# Patient Record
Sex: MALE | Race: WHITE | NOT HISPANIC OR LATINO
[De-identification: names, ages, dates, MRNs, and addresses within clinical notes are randomized per-mention and may not be internally consistent; named-entity substitution may affect disease eponyms.]

---

## 2017-02-02 ENCOUNTER — MEDICATION RENEWAL (OUTPATIENT)
Age: 72
End: 2017-02-02

## 2017-02-22 ENCOUNTER — APPOINTMENT (OUTPATIENT)
Dept: RHEUMATOLOGY | Facility: CLINIC | Age: 72
End: 2017-02-22

## 2017-03-01 ENCOUNTER — APPOINTMENT (OUTPATIENT)
Dept: DERMATOLOGY | Facility: CLINIC | Age: 72
End: 2017-03-01

## 2017-04-18 ENCOUNTER — MEDICATION RENEWAL (OUTPATIENT)
Age: 72
End: 2017-04-18

## 2017-06-21 ENCOUNTER — APPOINTMENT (OUTPATIENT)
Dept: RHEUMATOLOGY | Facility: CLINIC | Age: 72
End: 2017-06-21

## 2017-06-21 VITALS
BODY MASS INDEX: 22.53 KG/M2 | DIASTOLIC BLOOD PRESSURE: 80 MMHG | WEIGHT: 132 LBS | HEART RATE: 60 BPM | RESPIRATION RATE: 18 BRPM | SYSTOLIC BLOOD PRESSURE: 110 MMHG | HEIGHT: 64 IN

## 2017-06-21 DIAGNOSIS — Z00.00 ENCOUNTER FOR GENERAL ADULT MEDICAL EXAMINATION W/OUT ABNORMAL FINDINGS: ICD-10-CM

## 2017-06-21 DIAGNOSIS — R20.2 PARESTHESIA OF SKIN: ICD-10-CM

## 2017-06-22 LAB
APPEARANCE: CLEAR
BASOPHILS # BLD AUTO: 0.02 K/UL
BASOPHILS NFR BLD AUTO: 0.4 %
BILIRUBIN URINE: NEGATIVE
BLOOD URINE: NEGATIVE
COLOR: YELLOW
EOSINOPHIL # BLD AUTO: 0.1 K/UL
EOSINOPHIL NFR BLD AUTO: 2 %
GLUCOSE QUALITATIVE U: NORMAL MG/DL
HCT VFR BLD CALC: 38.2 %
HGB BLD-MCNC: 13.1 G/DL
IMM GRANULOCYTES NFR BLD AUTO: 0.2 %
KETONES URINE: NEGATIVE
LEUKOCYTE ESTERASE URINE: NEGATIVE
LYMPHOCYTES # BLD AUTO: 1.59 K/UL
LYMPHOCYTES NFR BLD AUTO: 32 %
MAN DIFF?: NORMAL
MCHC RBC-ENTMCNC: 31 PG
MCHC RBC-ENTMCNC: 34.3 GM/DL
MCV RBC AUTO: 90.5 FL
MONOCYTES # BLD AUTO: 0.39 K/UL
MONOCYTES NFR BLD AUTO: 7.8 %
NEUTROPHILS # BLD AUTO: 2.86 K/UL
NEUTROPHILS NFR BLD AUTO: 57.6 %
NITRITE URINE: NEGATIVE
PH URINE: 5.5
PLATELET # BLD AUTO: 239 K/UL
PROTEIN URINE: NEGATIVE MG/DL
RBC # BLD: 4.22 M/UL
RBC # FLD: 13.4 %
SPECIFIC GRAVITY URINE: 1.03
UROBILINOGEN URINE: NORMAL MG/DL
WBC # FLD AUTO: 4.97 K/UL
WESR: 10

## 2017-06-25 LAB
ALBUMIN SERPL ELPH-MCNC: 4.4 G/DL
ALP BLD-CCNC: 78 U/L
ALT SERPL-CCNC: 16 U/L
ANION GAP SERPL CALC-SCNC: 19 MMOL/L
AST SERPL-CCNC: 21 U/L
BILIRUB SERPL-MCNC: 0.9 MG/DL
BUN SERPL-MCNC: 27 MG/DL
CALCIUM SERPL-MCNC: 9.4 MG/DL
CHLORIDE SERPL-SCNC: 100 MMOL/L
CK SERPL-CCNC: 107 U/L
CO2 SERPL-SCNC: 20 MMOL/L
CREAT SERPL-MCNC: 1.03 MG/DL
CRP SERPL-MCNC: <0.2 MG/DL
GLUCOSE SERPL-MCNC: 88 MG/DL
PHOSPHATE SERPL-MCNC: 3.4 MG/DL
POTASSIUM SERPL-SCNC: 4.4 MMOL/L
PROT SERPL-MCNC: 6.9 G/DL
SODIUM SERPL-SCNC: 139 MMOL/L

## 2017-11-29 ENCOUNTER — APPOINTMENT (OUTPATIENT)
Dept: RHEUMATOLOGY | Facility: CLINIC | Age: 72
End: 2017-11-29
Payer: COMMERCIAL

## 2017-11-29 VITALS
OXYGEN SATURATION: 98 % | HEART RATE: 86 BPM | RESPIRATION RATE: 18 BRPM | HEIGHT: 64 IN | WEIGHT: 132 LBS | DIASTOLIC BLOOD PRESSURE: 72 MMHG | TEMPERATURE: 98.6 F | BODY MASS INDEX: 22.53 KG/M2 | SYSTOLIC BLOOD PRESSURE: 112 MMHG

## 2017-11-29 LAB
BILIRUB UR QL STRIP: NORMAL
GLUCOSE UR-MCNC: NORMAL
HCG UR QL: 0.2 EU/DL
HGB UR QL STRIP.AUTO: NORMAL
KETONES UR-MCNC: NORMAL
LEUKOCYTE ESTERASE UR QL STRIP: NORMAL
NITRITE UR QL STRIP: NORMAL
PH UR STRIP: 5.5
PROT UR STRIP-MCNC: NORMAL
SP GR UR STRIP: 1.03

## 2017-11-29 PROCEDURE — 99215 OFFICE O/P EST HI 40 MIN: CPT | Mod: 25

## 2017-11-29 PROCEDURE — 81003 URINALYSIS AUTO W/O SCOPE: CPT | Mod: QW

## 2017-11-29 PROCEDURE — 36415 COLL VENOUS BLD VENIPUNCTURE: CPT

## 2017-12-01 LAB
ALBUMIN SERPL ELPH-MCNC: 4.4 G/DL
ALP BLD-CCNC: 92 U/L
ALT SERPL-CCNC: 16 U/L
ANION GAP SERPL CALC-SCNC: 16 MMOL/L
AST SERPL-CCNC: 22 U/L
BASOPHILS # BLD AUTO: 0.03 K/UL
BASOPHILS NFR BLD AUTO: 0.6 %
BILIRUB SERPL-MCNC: 0.6 MG/DL
BUN SERPL-MCNC: 31 MG/DL
CALCIUM SERPL-MCNC: 9.7 MG/DL
CO2 SERPL-SCNC: 23 MMOL/L
CREAT SERPL-MCNC: 1.03 MG/DL
CRP SERPL-MCNC: <0.2 MG/DL
DEPRECATED KAPPA LC FREE/LAMBDA SER: 1.47 RATIO
EOSINOPHIL # BLD AUTO: 0.16 K/UL
EOSINOPHIL NFR BLD AUTO: 3 %
ERYTHROCYTE [SEDIMENTATION RATE] IN BLOOD BY WESTERGREN METHOD: 5 MM/HR
GLUCOSE SERPL-MCNC: 91 MG/DL
HCT VFR BLD CALC: 40.7 %
HGB BLD-MCNC: 13.9 G/DL
IGA SER QL IEP: 113 MG/DL
IGG SER QL IEP: 1090 MG/DL
IGM SER QL IEP: 46 MG/DL
IMM GRANULOCYTES NFR BLD AUTO: 0.2 %
KAPPA LC CSF-MCNC: 0.88 MG/DL
KAPPA LC SERPL-MCNC: 1.29 MG/DL
LYMPHOCYTES # BLD AUTO: 1.65 K/UL
LYMPHOCYTES NFR BLD AUTO: 30.6 %
M PROTEIN SPEC IFE-MCNC: NORMAL
MAN DIFF?: NORMAL
MCHC RBC-ENTMCNC: 31 PG
MCHC RBC-ENTMCNC: 34.2 GM/DL
MCV RBC AUTO: 90.8 FL
MONOCYTES # BLD AUTO: 0.41 K/UL
MONOCYTES NFR BLD AUTO: 7.6 %
NEUTROPHILS # BLD AUTO: 3.13 K/UL
NEUTROPHILS NFR BLD AUTO: 58 %
PHOSPHATE SERPL-MCNC: 3.8 MG/DL
PLATELET # BLD AUTO: 254 K/UL
POTASSIUM SERPL-SCNC: 4.7 MMOL/L
PROT SERPL-MCNC: 7.2 G/DL
RBC # BLD: 4.48 M/UL
RBC # FLD: 12.9 %
TSH SERPL-ACNC: 2.68 UIU/ML
WBC # FLD AUTO: 5.39 K/UL

## 2017-12-27 ENCOUNTER — RX RENEWAL (OUTPATIENT)
Age: 72
End: 2017-12-27

## 2018-02-22 ENCOUNTER — LABORATORY RESULT (OUTPATIENT)
Age: 73
End: 2018-02-22

## 2018-02-22 ENCOUNTER — APPOINTMENT (OUTPATIENT)
Dept: RHEUMATOLOGY | Facility: CLINIC | Age: 73
End: 2018-02-22
Payer: COMMERCIAL

## 2018-02-22 VITALS
HEART RATE: 63 BPM | RESPIRATION RATE: 17 BRPM | SYSTOLIC BLOOD PRESSURE: 120 MMHG | WEIGHT: 130 LBS | TEMPERATURE: 98.5 F | BODY MASS INDEX: 22.2 KG/M2 | HEIGHT: 64 IN | OXYGEN SATURATION: 99 % | DIASTOLIC BLOOD PRESSURE: 80 MMHG

## 2018-02-22 DIAGNOSIS — M21.931 UNSPECIFIED ACQUIRED DEFORMITY OF RIGHT FOREARM: ICD-10-CM

## 2018-02-22 PROCEDURE — 99214 OFFICE O/P EST MOD 30 MIN: CPT

## 2018-02-22 RX ORDER — IPRATROPIUM BROMIDE 42 UG/1
0.06 SPRAY NASAL
Qty: 15 | Refills: 0 | Status: DISCONTINUED | COMMUNITY
Start: 2017-12-13

## 2018-02-22 RX ORDER — MUPIROCIN 20 MG/G
2 OINTMENT TOPICAL
Qty: 22 | Refills: 0 | Status: DISCONTINUED | COMMUNITY
Start: 2018-02-08

## 2018-02-22 RX ORDER — AZITHROMYCIN 250 MG/1
250 TABLET, FILM COATED ORAL
Qty: 6 | Refills: 0 | Status: DISCONTINUED | COMMUNITY
Start: 2017-12-13

## 2018-02-22 RX ORDER — DOXYCYCLINE HYCLATE 100 MG/1
100 CAPSULE ORAL
Qty: 20 | Refills: 0 | Status: DISCONTINUED | COMMUNITY
Start: 2018-02-08

## 2018-02-22 RX ORDER — FLUTICASONE PROPIONATE 50 UG/1
50 SPRAY, METERED NASAL
Qty: 16 | Refills: 0 | Status: DISCONTINUED | COMMUNITY
Start: 2017-12-13

## 2018-02-22 RX ORDER — OFLOXACIN 3 MG/ML
0.3 SOLUTION/ DROPS OPHTHALMIC
Qty: 10 | Refills: 0 | Status: DISCONTINUED | COMMUNITY
Start: 2017-11-21

## 2018-02-22 RX ORDER — BENZONATATE 100 MG/1
100 CAPSULE ORAL
Qty: 30 | Refills: 0 | Status: DISCONTINUED | COMMUNITY
Start: 2017-12-13

## 2018-02-22 RX ORDER — DIPHENHYDRAMINE HYDROCHLORIDE AND LIDOCAINE HYDROCHLORIDE AND ALUMINUM HYDROXIDE AND MAGNESIUM HYDRO
KIT
Qty: 237 | Refills: 0 | Status: DISCONTINUED | COMMUNITY
Start: 2017-12-13

## 2018-02-22 RX ORDER — LEVOCETIRIZINE DIHYDROCHLORIDE 5 MG/1
5 TABLET ORAL
Qty: 21 | Refills: 0 | Status: DISCONTINUED | COMMUNITY
Start: 2017-11-21

## 2018-02-24 LAB
ALBUMIN SERPL ELPH-MCNC: 4.3 G/DL
ALP BLD-CCNC: 85 U/L
ALT SERPL-CCNC: 11 U/L
ANA PAT FLD IF-IMP: NORMAL
ANA SER IF-ACNC: ABNORMAL
ANION GAP SERPL CALC-SCNC: 13 MMOL/L
APPEARANCE: ABNORMAL
AST SERPL-CCNC: 20 U/L
BASOPHILS # BLD AUTO: 0.01 K/UL
BASOPHILS NFR BLD AUTO: 0.2 %
BILIRUB SERPL-MCNC: 0.5 MG/DL
BILIRUBIN URINE: NEGATIVE
BLOOD URINE: NEGATIVE
BUN SERPL-MCNC: 30 MG/DL
CALCIUM SERPL-MCNC: 9.9 MG/DL
CCP AB SER IA-ACNC: < 8 UNITS
CHLORIDE SERPL-SCNC: 103 MMOL/L
CK SERPL-CCNC: 67 U/L
CO2 SERPL-SCNC: 23 MMOL/L
COLOR: YELLOW
CREAT SERPL-MCNC: 1.08 MG/DL
CRP SERPL-MCNC: <0.2 MG/DL
ENA SS-A AB SER IA-ACNC: <0.2 AL
ENA SS-B AB SER IA-ACNC: <0.2 AL
EOSINOPHIL # BLD AUTO: 0.1 K/UL
EOSINOPHIL NFR BLD AUTO: 1.8 %
ERYTHROCYTE [SEDIMENTATION RATE] IN BLOOD BY WESTERGREN METHOD: 5 MM/HR
GLUCOSE QUALITATIVE U: NEGATIVE MG/DL
GLUCOSE SERPL-MCNC: 92 MG/DL
HCT VFR BLD CALC: 42.4 %
HGB BLD-MCNC: 13.9 G/DL
IMM GRANULOCYTES NFR BLD AUTO: 0.2 %
KETONES URINE: NEGATIVE
LEUKOCYTE ESTERASE URINE: NEGATIVE
LYMPHOCYTES # BLD AUTO: 1.82 K/UL
LYMPHOCYTES NFR BLD AUTO: 32.4 %
MAN DIFF?: NORMAL
MCHC RBC-ENTMCNC: 30 PG
MCHC RBC-ENTMCNC: 32.8 GM/DL
MCV RBC AUTO: 91.6 FL
MONOCYTES # BLD AUTO: 0.48 K/UL
MONOCYTES NFR BLD AUTO: 8.6 %
NEUTROPHILS # BLD AUTO: 3.19 K/UL
NEUTROPHILS NFR BLD AUTO: 56.8 %
NITRITE URINE: NEGATIVE
PH URINE: 7
PHOSPHATE SERPL-MCNC: 3.4 MG/DL
PLATELET # BLD AUTO: 244 K/UL
POTASSIUM SERPL-SCNC: 4.3 MMOL/L
PROT SERPL-MCNC: 7.4 G/DL
PROTEIN URINE: NEGATIVE MG/DL
RBC # BLD: 4.63 M/UL
RBC # FLD: 13.1 %
RF+CCP IGG SER-IMP: NEGATIVE
RHEUMATOID FACT SER QL: <7 IU/ML
SODIUM SERPL-SCNC: 139 MMOL/L
SPECIFIC GRAVITY URINE: 1.02
UROBILINOGEN URINE: NEGATIVE MG/DL
WBC # FLD AUTO: 5.61 K/UL

## 2018-03-02 ENCOUNTER — APPOINTMENT (OUTPATIENT)
Dept: DERMATOLOGY | Facility: CLINIC | Age: 73
End: 2018-03-02
Payer: COMMERCIAL

## 2018-03-02 PROCEDURE — 99214 OFFICE O/P EST MOD 30 MIN: CPT

## 2018-04-23 ENCOUNTER — APPOINTMENT (OUTPATIENT)
Dept: RHEUMATOLOGY | Facility: CLINIC | Age: 73
End: 2018-04-23
Payer: COMMERCIAL

## 2018-04-23 VITALS
DIASTOLIC BLOOD PRESSURE: 81 MMHG | SYSTOLIC BLOOD PRESSURE: 116 MMHG | TEMPERATURE: 98.1 F | BODY MASS INDEX: 23.39 KG/M2 | OXYGEN SATURATION: 98 % | RESPIRATION RATE: 17 BRPM | HEART RATE: 69 BPM | HEIGHT: 63 IN | WEIGHT: 132 LBS

## 2018-04-23 PROCEDURE — 99214 OFFICE O/P EST MOD 30 MIN: CPT

## 2018-04-29 ENCOUNTER — MEDICATION RENEWAL (OUTPATIENT)
Age: 73
End: 2018-04-29

## 2018-08-10 ENCOUNTER — APPOINTMENT (OUTPATIENT)
Dept: RHEUMATOLOGY | Facility: CLINIC | Age: 73
End: 2018-08-10

## 2018-08-14 ENCOUNTER — MEDICATION RENEWAL (OUTPATIENT)
Age: 73
End: 2018-08-14

## 2018-08-15 ENCOUNTER — APPOINTMENT (OUTPATIENT)
Dept: RHEUMATOLOGY | Facility: CLINIC | Age: 73
End: 2018-08-15
Payer: COMMERCIAL

## 2018-08-15 VITALS
BODY MASS INDEX: 22.2 KG/M2 | OXYGEN SATURATION: 98 % | HEART RATE: 65 BPM | RESPIRATION RATE: 16 BRPM | WEIGHT: 130 LBS | DIASTOLIC BLOOD PRESSURE: 64 MMHG | TEMPERATURE: 98.1 F | HEIGHT: 64 IN | SYSTOLIC BLOOD PRESSURE: 110 MMHG

## 2018-08-15 DIAGNOSIS — L40.9 PSORIASIS, UNSPECIFIED: ICD-10-CM

## 2018-08-15 PROCEDURE — 81003 URINALYSIS AUTO W/O SCOPE: CPT | Mod: QW

## 2018-08-15 PROCEDURE — 36415 COLL VENOUS BLD VENIPUNCTURE: CPT

## 2018-08-15 PROCEDURE — 99214 OFFICE O/P EST MOD 30 MIN: CPT | Mod: 25

## 2018-08-15 PROCEDURE — 85651 RBC SED RATE NONAUTOMATED: CPT

## 2018-08-16 LAB
BILIRUB UR QL STRIP: NORMAL
CLARITY UR: CLEAR
COLLECTION METHOD: NORMAL
GLUCOSE UR-MCNC: NORMAL
HCG UR QL: 0.2 EU/DL
HGB UR QL STRIP.AUTO: NORMAL
KETONES UR-MCNC: NORMAL
LEUKOCYTE ESTERASE UR QL STRIP: NORMAL
NITRITE UR QL STRIP: NORMAL
PH UR STRIP: 6.5
PROT UR STRIP-MCNC: NORMAL
SP GR UR STRIP: 1.01
WESR: 7

## 2018-08-17 LAB
ALBUMIN SERPL ELPH-MCNC: 4.5 G/DL
ALP BLD-CCNC: 88 U/L
ALT SERPL-CCNC: 16 U/L
ANION GAP SERPL CALC-SCNC: 16 MMOL/L
AST SERPL-CCNC: 20 U/L
BASOPHILS # BLD AUTO: 0.02 K/UL
BASOPHILS NFR BLD AUTO: 0.4 %
BILIRUB SERPL-MCNC: 0.6 MG/DL
BUN SERPL-MCNC: 20 MG/DL
CALCIUM SERPL-MCNC: 9.6 MG/DL
CHLORIDE SERPL-SCNC: 101 MMOL/L
CO2 SERPL-SCNC: 23 MMOL/L
CREAT SERPL-MCNC: 0.9 MG/DL
CRP SERPL-MCNC: <0.1 MG/DL
EOSINOPHIL # BLD AUTO: 0.08 K/UL
EOSINOPHIL NFR BLD AUTO: 1.7 %
GLUCOSE SERPL-MCNC: 95 MG/DL
HCT VFR BLD CALC: 43.8 %
HGB BLD-MCNC: 14.1 G/DL
IMM GRANULOCYTES NFR BLD AUTO: 0.2 %
LYMPHOCYTES # BLD AUTO: 1.42 K/UL
LYMPHOCYTES NFR BLD AUTO: 30.7 %
MAN DIFF?: NORMAL
MCHC RBC-ENTMCNC: 30.5 PG
MCHC RBC-ENTMCNC: 32.2 GM/DL
MCV RBC AUTO: 94.6 FL
MONOCYTES # BLD AUTO: 0.28 K/UL
MONOCYTES NFR BLD AUTO: 6.1 %
NEUTROPHILS # BLD AUTO: 2.81 K/UL
NEUTROPHILS NFR BLD AUTO: 60.9 %
PHOSPHATE SERPL-MCNC: 3.1 MG/DL
PLATELET # BLD AUTO: 257 K/UL
POTASSIUM SERPL-SCNC: 4.6 MMOL/L
PROT SERPL-MCNC: 6.9 G/DL
RBC # BLD: 4.63 M/UL
RBC # FLD: 14.2 %
SODIUM SERPL-SCNC: 140 MMOL/L
WBC # FLD AUTO: 4.62 K/UL

## 2018-10-05 ENCOUNTER — MEDICATION RENEWAL (OUTPATIENT)
Age: 73
End: 2018-10-05

## 2018-11-12 ENCOUNTER — LABORATORY RESULT (OUTPATIENT)
Age: 73
End: 2018-11-12

## 2018-11-13 ENCOUNTER — APPOINTMENT (OUTPATIENT)
Dept: RHEUMATOLOGY | Facility: CLINIC | Age: 73
End: 2018-11-13
Payer: COMMERCIAL

## 2018-11-13 VITALS
HEART RATE: 69 BPM | BODY MASS INDEX: 22.53 KG/M2 | TEMPERATURE: 97.5 F | HEIGHT: 64 IN | OXYGEN SATURATION: 98 % | DIASTOLIC BLOOD PRESSURE: 84 MMHG | RESPIRATION RATE: 17 BRPM | WEIGHT: 132 LBS | SYSTOLIC BLOOD PRESSURE: 138 MMHG

## 2018-11-13 PROCEDURE — 36415 COLL VENOUS BLD VENIPUNCTURE: CPT

## 2018-11-13 PROCEDURE — 99215 OFFICE O/P EST HI 40 MIN: CPT | Mod: 25

## 2018-11-13 PROCEDURE — 81003 URINALYSIS AUTO W/O SCOPE: CPT | Mod: NC,QW

## 2018-11-13 RX ORDER — TOLMETIN SODIUM 600 MG/1
600 TABLET, FILM COATED ORAL
Qty: 180 | Refills: 0 | Status: DISCONTINUED | COMMUNITY
Start: 2018-04-23 | End: 2018-11-13

## 2018-11-15 LAB
ALBUMIN SERPL ELPH-MCNC: 4.5 G/DL
ALP BLD-CCNC: 90 U/L
ALT SERPL-CCNC: 15 U/L
ANION GAP SERPL CALC-SCNC: 14 MMOL/L
AST SERPL-CCNC: 21 U/L
B BURGDOR IGG+IGM SER QL IB: NORMAL
BASOPHILS # BLD AUTO: 0.03 K/UL
BASOPHILS NFR BLD AUTO: 0.6 %
BILIRUB SERPL-MCNC: 0.6 MG/DL
BILIRUB UR QL STRIP: NORMAL
BUN SERPL-MCNC: 28 MG/DL
CALCIUM SERPL-MCNC: 9.5 MG/DL
CHLORIDE SERPL-SCNC: 103 MMOL/L
CLARITY UR: CLEAR
CO2 SERPL-SCNC: 23 MMOL/L
COLLECTION METHOD: NORMAL
CREAT SERPL-MCNC: 0.85 MG/DL
CRP SERPL-MCNC: 0.11 MG/DL
DEPRECATED KAPPA LC FREE/LAMBDA SER: 1.53 RATIO
ENA SS-A AB SER IA-ACNC: <0.2 AL
ENA SS-B AB SER IA-ACNC: <0.2 AL
EOSINOPHIL # BLD AUTO: 0.17 K/UL
EOSINOPHIL NFR BLD AUTO: 3.2 %
ERYTHROCYTE [SEDIMENTATION RATE] IN BLOOD BY WESTERGREN METHOD: 11 MM/HR
GLUCOSE SERPL-MCNC: 86 MG/DL
GLUCOSE UR-MCNC: NORMAL
HCG UR QL: 0.2 EU/DL
HCT VFR BLD CALC: 43.2 %
HGB BLD-MCNC: 14.7 G/DL
HGB UR QL STRIP.AUTO: NORMAL
IGA SER QL IEP: 127 MG/DL
IGG SER QL IEP: 1055 MG/DL
IGM SER QL IEP: 51 MG/DL
IMM GRANULOCYTES NFR BLD AUTO: 0.2 %
KAPPA LC CSF-MCNC: 0.88 MG/DL
KAPPA LC SERPL-MCNC: 1.35 MG/DL
KETONES UR-MCNC: NORMAL
LEUKOCYTE ESTERASE UR QL STRIP: NORMAL
LYMPHOCYTES # BLD AUTO: 1.63 K/UL
LYMPHOCYTES NFR BLD AUTO: 30.4 %
M PROTEIN SPEC IFE-MCNC: NORMAL
MAN DIFF?: NORMAL
MCHC RBC-ENTMCNC: 31.5 PG
MCHC RBC-ENTMCNC: 34 GM/DL
MCV RBC AUTO: 92.5 FL
MONOCYTES # BLD AUTO: 0.45 K/UL
MONOCYTES NFR BLD AUTO: 8.4 %
NEUTROPHILS # BLD AUTO: 3.08 K/UL
NEUTROPHILS NFR BLD AUTO: 57.2 %
NITRITE UR QL STRIP: NORMAL
PH UR STRIP: 5.5
PHOSPHATE SERPL-MCNC: 4 MG/DL
PLATELET # BLD AUTO: 282 K/UL
POTASSIUM SERPL-SCNC: 4.9 MMOL/L
PROT SERPL-MCNC: 7.2 G/DL
PROT UR STRIP-MCNC: NORMAL
RBC # BLD: 4.67 M/UL
RBC # FLD: 13.5 %
RHEUMATOID FACT SER QL: <10 IU/ML
SODIUM SERPL-SCNC: 140 MMOL/L
SP GR UR STRIP: >=1.03
TSH SERPL-ACNC: 3.37 UIU/ML
WBC # FLD AUTO: 5.37 K/UL

## 2018-11-17 LAB
ANA PAT FLD IF-IMP: NORMAL
ANA SER IF-ACNC: ABNORMAL
CCP AB SER IA-ACNC: <8 UNITS
DSDNA AB SER-ACNC: 12 IU/ML
RF+CCP IGG SER-IMP: NEGATIVE

## 2019-02-21 ENCOUNTER — APPOINTMENT (OUTPATIENT)
Dept: RHEUMATOLOGY | Facility: CLINIC | Age: 74
End: 2019-02-21
Payer: COMMERCIAL

## 2019-02-21 VITALS
TEMPERATURE: 98.3 F | HEART RATE: 55 BPM | OXYGEN SATURATION: 98 % | WEIGHT: 132 LBS | HEIGHT: 64 IN | BODY MASS INDEX: 22.53 KG/M2 | RESPIRATION RATE: 17 BRPM | DIASTOLIC BLOOD PRESSURE: 60 MMHG | SYSTOLIC BLOOD PRESSURE: 114 MMHG

## 2019-02-21 DIAGNOSIS — M25.532 PAIN IN RIGHT WRIST: ICD-10-CM

## 2019-02-21 DIAGNOSIS — M25.531 PAIN IN RIGHT WRIST: ICD-10-CM

## 2019-02-21 PROCEDURE — 81003 URINALYSIS AUTO W/O SCOPE: CPT | Mod: NC,QW

## 2019-02-21 PROCEDURE — 99214 OFFICE O/P EST MOD 30 MIN: CPT | Mod: 25

## 2019-02-21 PROCEDURE — 36415 COLL VENOUS BLD VENIPUNCTURE: CPT

## 2019-02-23 LAB
ALBUMIN SERPL ELPH-MCNC: 4.6 G/DL
ALP BLD-CCNC: 87 U/L
ALT SERPL-CCNC: 15 U/L
ANION GAP SERPL CALC-SCNC: 19 MMOL/L
AST SERPL-CCNC: 20 U/L
BASOPHILS # BLD AUTO: 0.03 K/UL
BASOPHILS NFR BLD AUTO: 0.6 %
BILIRUB SERPL-MCNC: 0.7 MG/DL
BILIRUB UR QL STRIP: NORMAL
BUN SERPL-MCNC: 21 MG/DL
CALCIUM SERPL-MCNC: 9.4 MG/DL
CCP AB SER IA-ACNC: <8 UNITS
CHLORIDE SERPL-SCNC: 105 MMOL/L
CLARITY UR: CLEAR
CO2 SERPL-SCNC: 18 MMOL/L
COLLECTION METHOD: NORMAL
CREAT SERPL-MCNC: 0.96 MG/DL
CRP SERPL-MCNC: <0.1 MG/DL
EOSINOPHIL # BLD AUTO: 0.15 K/UL
EOSINOPHIL NFR BLD AUTO: 3.1 %
ERYTHROCYTE [SEDIMENTATION RATE] IN BLOOD BY WESTERGREN METHOD: 4 MM/HR
GLUCOSE SERPL-MCNC: 110 MG/DL
GLUCOSE UR-MCNC: NORMAL
HCG UR QL: 0.2 EU/DL
HCT VFR BLD CALC: 42.4 %
HGB BLD-MCNC: 13.8 G/DL
HGB UR QL STRIP.AUTO: NORMAL
IMM GRANULOCYTES NFR BLD AUTO: 0.2 %
KETONES UR-MCNC: NORMAL
LEUKOCYTE ESTERASE UR QL STRIP: NORMAL
LYMPHOCYTES # BLD AUTO: 1.89 K/UL
LYMPHOCYTES NFR BLD AUTO: 39.4 %
MAN DIFF?: NORMAL
MCHC RBC-ENTMCNC: 30.4 PG
MCHC RBC-ENTMCNC: 32.5 GM/DL
MCV RBC AUTO: 93.4 FL
MONOCYTES # BLD AUTO: 0.31 K/UL
MONOCYTES NFR BLD AUTO: 6.5 %
NEUTROPHILS # BLD AUTO: 2.41 K/UL
NEUTROPHILS NFR BLD AUTO: 50.2 %
NITRITE UR QL STRIP: NORMAL
PH UR STRIP: 6
PHOSPHATE SERPL-MCNC: 2.8 MG/DL
PLATELET # BLD AUTO: 236 K/UL
POTASSIUM SERPL-SCNC: 3.9 MMOL/L
PROT SERPL-MCNC: 6.8 G/DL
PROT UR STRIP-MCNC: NORMAL
RBC # BLD: 4.54 M/UL
RBC # FLD: 13 %
RF+CCP IGG SER-IMP: NEGATIVE
SODIUM SERPL-SCNC: 142 MMOL/L
SP GR UR STRIP: 1.02
WBC # FLD AUTO: 4.8 K/UL

## 2019-02-27 LAB — HLA-B27 RELATED AG QL: NORMAL

## 2019-03-01 ENCOUNTER — APPOINTMENT (OUTPATIENT)
Dept: DERMATOLOGY | Facility: CLINIC | Age: 74
End: 2019-03-01
Payer: COMMERCIAL

## 2019-03-01 PROCEDURE — 99213 OFFICE O/P EST LOW 20 MIN: CPT

## 2019-03-02 LAB — RHEUMATOID FACT SER QL: 10 IU/ML

## 2019-04-25 ENCOUNTER — APPOINTMENT (OUTPATIENT)
Dept: RHEUMATOLOGY | Facility: CLINIC | Age: 74
End: 2019-04-25
Payer: COMMERCIAL

## 2019-04-25 ENCOUNTER — LABORATORY RESULT (OUTPATIENT)
Age: 74
End: 2019-04-25

## 2019-04-25 VITALS
TEMPERATURE: 98.1 F | OXYGEN SATURATION: 98 % | DIASTOLIC BLOOD PRESSURE: 82 MMHG | HEART RATE: 60 BPM | SYSTOLIC BLOOD PRESSURE: 118 MMHG | RESPIRATION RATE: 18 BRPM

## 2019-04-25 PROCEDURE — 99215 OFFICE O/P EST HI 40 MIN: CPT | Mod: 25

## 2019-04-25 PROCEDURE — 36415 COLL VENOUS BLD VENIPUNCTURE: CPT

## 2019-04-25 PROCEDURE — 81003 URINALYSIS AUTO W/O SCOPE: CPT | Mod: NC,QW

## 2019-04-26 LAB
ALBUMIN SERPL ELPH-MCNC: 4.6 G/DL
ALP BLD-CCNC: 75 U/L
ALT SERPL-CCNC: 15 U/L
ANION GAP SERPL CALC-SCNC: 12 MMOL/L
AST SERPL-CCNC: 21 U/L
B BURGDOR IGG+IGM SER QL IB: NORMAL
BASOPHILS # BLD AUTO: 0.03 K/UL
BASOPHILS NFR BLD AUTO: 0.6 %
BILIRUB SERPL-MCNC: 0.6 MG/DL
BILIRUB UR QL STRIP: NORMAL
BUN SERPL-MCNC: 26 MG/DL
CALCIUM SERPL-MCNC: 9.4 MG/DL
CCP AB SER IA-ACNC: <8 UNITS
CHLORIDE SERPL-SCNC: 104 MMOL/L
CLARITY UR: CLEAR
CO2 SERPL-SCNC: 23 MMOL/L
COLLECTION METHOD: NORMAL
CREAT SERPL-MCNC: 0.95 MG/DL
CRP SERPL-MCNC: <0.1 MG/DL
EOSINOPHIL # BLD AUTO: 0.11 K/UL
EOSINOPHIL NFR BLD AUTO: 2.2 %
ERYTHROCYTE [SEDIMENTATION RATE] IN BLOOD BY WESTERGREN METHOD: 6 MM/HR
GLUCOSE SERPL-MCNC: 94 MG/DL
GLUCOSE UR-MCNC: NORMAL
HCG UR QL: 0.2 EU/DL
HCT VFR BLD CALC: 42.6 %
HGB BLD-MCNC: 14.2 G/DL
HGB UR QL STRIP.AUTO: NORMAL
IMM GRANULOCYTES NFR BLD AUTO: 0.2 %
KETONES UR-MCNC: NORMAL
LDH SERPL-CCNC: 173 U/L
LEUKOCYTE ESTERASE UR QL STRIP: NORMAL
LYMPHOCYTES # BLD AUTO: 1.39 K/UL
LYMPHOCYTES NFR BLD AUTO: 27.4 %
MAN DIFF?: NORMAL
MCHC RBC-ENTMCNC: 30.4 PG
MCHC RBC-ENTMCNC: 33.3 GM/DL
MCV RBC AUTO: 91.2 FL
MONOCYTES # BLD AUTO: 0.37 K/UL
MONOCYTES NFR BLD AUTO: 7.3 %
NEUTROPHILS # BLD AUTO: 3.17 K/UL
NEUTROPHILS NFR BLD AUTO: 62.3 %
NITRITE UR QL STRIP: NORMAL
PH UR STRIP: 5.5
PHOSPHATE SERPL-MCNC: 3.4 MG/DL
PLATELET # BLD AUTO: 236 K/UL
POTASSIUM SERPL-SCNC: 4.2 MMOL/L
PROT SERPL-MCNC: 7 G/DL
PROT UR STRIP-MCNC: NORMAL
RBC # BLD: 4.67 M/UL
RBC # FLD: 12.6 %
RF+CCP IGG SER-IMP: NEGATIVE
RHEUMATOID FACT SER QL: 11 IU/ML
SODIUM SERPL-SCNC: 139 MMOL/L
SP GR UR STRIP: 1.02
WBC # FLD AUTO: 5.08 K/UL

## 2019-04-30 LAB — ANA SER IF-ACNC: NEGATIVE

## 2019-06-27 ENCOUNTER — APPOINTMENT (OUTPATIENT)
Dept: RHEUMATOLOGY | Facility: CLINIC | Age: 74
End: 2019-06-27
Payer: COMMERCIAL

## 2019-06-27 VITALS — RESPIRATION RATE: 16 BRPM | DIASTOLIC BLOOD PRESSURE: 80 MMHG | HEART RATE: 72 BPM | SYSTOLIC BLOOD PRESSURE: 130 MMHG

## 2019-06-27 PROCEDURE — 99214 OFFICE O/P EST MOD 30 MIN: CPT

## 2019-08-28 ENCOUNTER — APPOINTMENT (OUTPATIENT)
Dept: RHEUMATOLOGY | Facility: CLINIC | Age: 74
End: 2019-08-28
Payer: COMMERCIAL

## 2019-08-28 ENCOUNTER — LABORATORY RESULT (OUTPATIENT)
Age: 74
End: 2019-08-28

## 2019-08-28 VITALS
SYSTOLIC BLOOD PRESSURE: 120 MMHG | WEIGHT: 130 LBS | HEIGHT: 64 IN | OXYGEN SATURATION: 99 % | RESPIRATION RATE: 13 BRPM | HEART RATE: 56 BPM | DIASTOLIC BLOOD PRESSURE: 80 MMHG | TEMPERATURE: 98.4 F | BODY MASS INDEX: 22.2 KG/M2

## 2019-08-28 DIAGNOSIS — M21.932 UNSPECIFIED ACQUIRED DEFORMITY OF LEFT FOREARM: ICD-10-CM

## 2019-08-28 PROCEDURE — 81003 URINALYSIS AUTO W/O SCOPE: CPT | Mod: NC,QW

## 2019-08-28 PROCEDURE — 36415 COLL VENOUS BLD VENIPUNCTURE: CPT

## 2019-08-28 PROCEDURE — 99214 OFFICE O/P EST MOD 30 MIN: CPT | Mod: 25

## 2019-08-30 LAB
ALBUMIN SERPL ELPH-MCNC: 4.9 G/DL
ALP BLD-CCNC: 80 U/L
ALT SERPL-CCNC: 17 U/L
ANION GAP SERPL CALC-SCNC: 16 MMOL/L
AST SERPL-CCNC: 23 U/L
BASOPHILS # BLD AUTO: 0.03 K/UL
BASOPHILS NFR BLD AUTO: 0.7 %
BILIRUB SERPL-MCNC: 1.1 MG/DL
BILIRUB UR QL STRIP: NORMAL
BUN SERPL-MCNC: 20 MG/DL
CALCIUM SERPL-MCNC: 9.7 MG/DL
CHLORIDE SERPL-SCNC: 100 MMOL/L
CLARITY UR: CLEAR
CO2 SERPL-SCNC: 24 MMOL/L
COLLECTION METHOD: NORMAL
CREAT SERPL-MCNC: 0.92 MG/DL
CRP SERPL-MCNC: 0.11 MG/DL
DEPRECATED KAPPA LC FREE/LAMBDA SER: 1.32 RATIO
EOSINOPHIL # BLD AUTO: 0.08 K/UL
EOSINOPHIL NFR BLD AUTO: 1.9 %
ERYTHROCYTE [SEDIMENTATION RATE] IN BLOOD BY WESTERGREN METHOD: 2 MM/HR
FERRITIN SERPL-MCNC: 169 NG/ML
GLUCOSE SERPL-MCNC: 94 MG/DL
GLUCOSE UR-MCNC: NORMAL
HCG UR QL: 0.2 EU/DL
HCT VFR BLD CALC: 44.6 %
HGB BLD-MCNC: 14.9 G/DL
HGB UR QL STRIP.AUTO: NORMAL
IGA SER QL IEP: 160 MG/DL
IGG SER QL IEP: 1121 MG/DL
IGM SER QL IEP: 51 MG/DL
IMM GRANULOCYTES NFR BLD AUTO: 0.2 %
IRON SATN MFR SERPL: 55 %
IRON SERPL-MCNC: 180 UG/DL
KAPPA LC CSF-MCNC: 0.87 MG/DL
KAPPA LC SERPL-MCNC: 1.15 MG/DL
KETONES UR-MCNC: NORMAL
LEUKOCYTE ESTERASE UR QL STRIP: NORMAL
LYMPHOCYTES # BLD AUTO: 1.71 K/UL
LYMPHOCYTES NFR BLD AUTO: 41.1 %
M PROTEIN SPEC IFE-MCNC: NORMAL
MAN DIFF?: NORMAL
MCHC RBC-ENTMCNC: 31.4 PG
MCHC RBC-ENTMCNC: 33.4 GM/DL
MCV RBC AUTO: 94.1 FL
MONOCYTES # BLD AUTO: 0.36 K/UL
MONOCYTES NFR BLD AUTO: 8.7 %
NEUTROPHILS # BLD AUTO: 1.97 K/UL
NEUTROPHILS NFR BLD AUTO: 47.4 %
NITRITE UR QL STRIP: NORMAL
PH UR STRIP: 7.5
PHOSPHATE SERPL-MCNC: 2.8 MG/DL
PLATELET # BLD AUTO: 249 K/UL
POTASSIUM SERPL-SCNC: 4.2 MMOL/L
PROT SERPL-MCNC: 7.4 G/DL
PROT UR STRIP-MCNC: NORMAL
RBC # BLD: 4.74 M/UL
RBC # FLD: 13.2 %
SODIUM SERPL-SCNC: 140 MMOL/L
SP GR UR STRIP: 1.02
T3 SERPL-MCNC: 97 NG/DL
T3RU NFR SERPL: 1 TBI
T4 SERPL-MCNC: 6 UG/DL
TIBC SERPL-MCNC: 325 UG/DL
TSH SERPL-ACNC: 2.18 UIU/ML
UIBC SERPL-MCNC: 145 UG/DL
WBC # FLD AUTO: 4.16 K/UL

## 2019-10-14 ENCOUNTER — APPOINTMENT (OUTPATIENT)
Dept: RHEUMATOLOGY | Facility: CLINIC | Age: 74
End: 2019-10-14

## 2019-11-26 ENCOUNTER — APPOINTMENT (OUTPATIENT)
Dept: RHEUMATOLOGY | Facility: CLINIC | Age: 74
End: 2019-11-26
Payer: COMMERCIAL

## 2019-11-26 VITALS
RESPIRATION RATE: 18 BRPM | OXYGEN SATURATION: 99 % | DIASTOLIC BLOOD PRESSURE: 80 MMHG | HEART RATE: 70 BPM | TEMPERATURE: 97.5 F | SYSTOLIC BLOOD PRESSURE: 122 MMHG

## 2019-11-26 PROCEDURE — 99215 OFFICE O/P EST HI 40 MIN: CPT

## 2020-03-02 ENCOUNTER — APPOINTMENT (OUTPATIENT)
Dept: DERMATOLOGY | Facility: CLINIC | Age: 75
End: 2020-03-02
Payer: COMMERCIAL

## 2020-03-02 PROCEDURE — 99213 OFFICE O/P EST LOW 20 MIN: CPT

## 2020-03-13 ENCOUNTER — APPOINTMENT (OUTPATIENT)
Dept: RHEUMATOLOGY | Facility: CLINIC | Age: 75
End: 2020-03-13
Payer: COMMERCIAL

## 2020-03-13 VITALS
SYSTOLIC BLOOD PRESSURE: 114 MMHG | RESPIRATION RATE: 17 BRPM | OXYGEN SATURATION: 99 % | WEIGHT: 132 LBS | DIASTOLIC BLOOD PRESSURE: 74 MMHG | TEMPERATURE: 98.2 F | BODY MASS INDEX: 22.53 KG/M2 | HEART RATE: 63 BPM | HEIGHT: 64 IN

## 2020-03-13 DIAGNOSIS — M21.932 UNSPECIFIED ACQUIRED DEFORMITY OF RIGHT FOREARM: ICD-10-CM

## 2020-03-13 DIAGNOSIS — M21.931 UNSPECIFIED ACQUIRED DEFORMITY OF RIGHT FOREARM: ICD-10-CM

## 2020-03-13 PROCEDURE — 99358 PROLONG SERVICE W/O CONTACT: CPT

## 2020-03-13 PROCEDURE — 36415 COLL VENOUS BLD VENIPUNCTURE: CPT

## 2020-03-13 PROCEDURE — 99215 OFFICE O/P EST HI 40 MIN: CPT | Mod: 25

## 2020-03-13 PROCEDURE — 81003 URINALYSIS AUTO W/O SCOPE: CPT | Mod: QW

## 2020-03-15 PROBLEM — M21.931 DEFORMITY OF BOTH WRISTS: Status: RESOLVED | Noted: 2018-11-13 | Resolved: 2020-03-15

## 2020-03-15 LAB
ALBUMIN SERPL ELPH-MCNC: 4.8 G/DL
ALP BLD-CCNC: 79 U/L
ALT SERPL-CCNC: 19 U/L
ANION GAP SERPL CALC-SCNC: 12 MMOL/L
AST SERPL-CCNC: 25 U/L
BASOPHILS # BLD AUTO: 0.02 K/UL
BASOPHILS NFR BLD AUTO: 0.4 %
BILIRUB SERPL-MCNC: 0.6 MG/DL
BILIRUB UR QL STRIP: NORMAL
BUN SERPL-MCNC: 24 MG/DL
CALCIUM SERPL-MCNC: 9.6 MG/DL
CHLORIDE SERPL-SCNC: 104 MMOL/L
CK SERPL-CCNC: 109 U/L
CLARITY UR: CLEAR
CO2 SERPL-SCNC: 23 MMOL/L
COLLECTION METHOD: NORMAL
CREAT SERPL-MCNC: 0.95 MG/DL
CRP SERPL-MCNC: <0.1 MG/DL
EOSINOPHIL # BLD AUTO: 0.09 K/UL
EOSINOPHIL NFR BLD AUTO: 1.9 %
ERYTHROCYTE [SEDIMENTATION RATE] IN BLOOD BY WESTERGREN METHOD: 3 MM/HR
GLUCOSE SERPL-MCNC: 95 MG/DL
GLUCOSE UR-MCNC: NORMAL
HCG UR QL: 0.2 EU/DL
HCT VFR BLD CALC: 44.3 %
HGB BLD-MCNC: 14.6 G/DL
HGB UR QL STRIP.AUTO: NORMAL
IMM GRANULOCYTES NFR BLD AUTO: 0.2 %
KETONES UR-MCNC: NORMAL
LDH SERPL-CCNC: 166 U/L
LEUKOCYTE ESTERASE UR QL STRIP: NORMAL
LYMPHOCYTES # BLD AUTO: 1.5 K/UL
LYMPHOCYTES NFR BLD AUTO: 31 %
MAN DIFF?: NORMAL
MCHC RBC-ENTMCNC: 30.9 PG
MCHC RBC-ENTMCNC: 33 GM/DL
MCV RBC AUTO: 93.9 FL
MONOCYTES # BLD AUTO: 0.36 K/UL
MONOCYTES NFR BLD AUTO: 7.4 %
NEUTROPHILS # BLD AUTO: 2.86 K/UL
NEUTROPHILS NFR BLD AUTO: 59.1 %
NITRITE UR QL STRIP: NORMAL
PH UR STRIP: 7
PHOSPHATE SERPL-MCNC: 3.3 MG/DL
PLATELET # BLD AUTO: 227 K/UL
POTASSIUM SERPL-SCNC: 4.3 MMOL/L
PROT SERPL-MCNC: 7.1 G/DL
PROT UR STRIP-MCNC: NORMAL
RBC # BLD: 4.72 M/UL
RBC # FLD: 13.1 %
SODIUM SERPL-SCNC: 140 MMOL/L
SP GR UR STRIP: 1.02
WBC # FLD AUTO: 4.84 K/UL

## 2020-03-22 LAB
CALCIUM SERPL-MCNC: 9.6 MG/DL
FERRITIN SERPL-MCNC: 100 NG/ML
IRON SATN MFR SERPL: 61 %
IRON SERPL-MCNC: 195 UG/DL
MAGNESIUM SERPL-MCNC: 2.3 MG/DL
PARATHYROID HORMONE INTACT: 40 PG/ML
TIBC SERPL-MCNC: 321 UG/DL
UIBC SERPL-MCNC: 126 UG/DL

## 2020-07-22 ENCOUNTER — APPOINTMENT (OUTPATIENT)
Dept: RHEUMATOLOGY | Facility: CLINIC | Age: 75
End: 2020-07-22

## 2020-07-30 ENCOUNTER — APPOINTMENT (OUTPATIENT)
Dept: RHEUMATOLOGY | Facility: CLINIC | Age: 75
End: 2020-07-30
Payer: COMMERCIAL

## 2020-07-30 PROCEDURE — 99214 OFFICE O/P EST MOD 30 MIN: CPT | Mod: 95

## 2020-08-04 RX ORDER — TOLMETIN SODIUM 600 MG/1
600 TABLET, FILM COATED ORAL
Qty: 180 | Refills: 0 | Status: DISCONTINUED | COMMUNITY
Start: 2018-11-13 | End: 2020-08-04

## 2020-08-04 RX ORDER — DICYCLOMINE HYDROCHLORIDE 20 MG/1
20 TABLET ORAL
Qty: 42 | Refills: 0 | Status: DISCONTINUED | COMMUNITY
Start: 2019-07-25

## 2020-08-04 RX ORDER — SILDENAFIL 50 MG/1
50 TABLET ORAL
Qty: 6 | Refills: 0 | Status: DISCONTINUED | COMMUNITY
Start: 2020-06-11

## 2020-08-04 RX ORDER — AZELASTINE HYDROCHLORIDE 137 UG/1
0.1 SPRAY, METERED NASAL
Qty: 30 | Refills: 0 | Status: DISCONTINUED | COMMUNITY
Start: 2020-03-17

## 2020-11-11 ENCOUNTER — APPOINTMENT (OUTPATIENT)
Dept: RHEUMATOLOGY | Facility: CLINIC | Age: 75
End: 2020-11-11
Payer: COMMERCIAL

## 2020-11-11 VITALS
RESPIRATION RATE: 17 BRPM | BODY MASS INDEX: 22.88 KG/M2 | SYSTOLIC BLOOD PRESSURE: 122 MMHG | TEMPERATURE: 98.2 F | HEIGHT: 64 IN | OXYGEN SATURATION: 99 % | WEIGHT: 134 LBS | HEART RATE: 54 BPM | DIASTOLIC BLOOD PRESSURE: 78 MMHG

## 2020-11-11 PROCEDURE — 99214 OFFICE O/P EST MOD 30 MIN: CPT

## 2020-11-23 RX ORDER — ROSUVASTATIN CALCIUM 5 MG/1
5 TABLET, FILM COATED ORAL
Refills: 0 | Status: ACTIVE | COMMUNITY

## 2020-11-23 RX ORDER — NABUMETONE 750 MG/1
750 TABLET, FILM COATED ORAL
Qty: 180 | Refills: 0 | Status: DISCONTINUED | COMMUNITY
Start: 2020-08-04 | End: 2020-11-23

## 2021-02-19 ENCOUNTER — APPOINTMENT (OUTPATIENT)
Dept: RHEUMATOLOGY | Facility: CLINIC | Age: 76
End: 2021-02-19
Payer: COMMERCIAL

## 2021-02-19 VITALS
OXYGEN SATURATION: 98 % | BODY MASS INDEX: 23.05 KG/M2 | SYSTOLIC BLOOD PRESSURE: 122 MMHG | TEMPERATURE: 97.3 F | RESPIRATION RATE: 17 BRPM | DIASTOLIC BLOOD PRESSURE: 80 MMHG | HEART RATE: 59 BPM | WEIGHT: 135 LBS | HEIGHT: 64 IN

## 2021-02-19 DIAGNOSIS — M70.61 TROCHANTERIC BURSITIS, RIGHT HIP: ICD-10-CM

## 2021-02-19 DIAGNOSIS — M70.62 TROCHANTERIC BURSITIS, RIGHT HIP: ICD-10-CM

## 2021-02-19 DIAGNOSIS — M72.0 PALMAR FASCIAL FIBROMATOSIS [DUPUYTREN]: ICD-10-CM

## 2021-02-19 PROCEDURE — 99072 ADDL SUPL MATRL&STAF TM PHE: CPT

## 2021-02-19 PROCEDURE — 99215 OFFICE O/P EST HI 40 MIN: CPT

## 2021-03-01 ENCOUNTER — APPOINTMENT (OUTPATIENT)
Dept: DERMATOLOGY | Facility: CLINIC | Age: 76
End: 2021-03-01
Payer: COMMERCIAL

## 2021-03-01 PROCEDURE — 99072 ADDL SUPL MATRL&STAF TM PHE: CPT

## 2021-03-01 PROCEDURE — 99214 OFFICE O/P EST MOD 30 MIN: CPT

## 2021-05-28 ENCOUNTER — APPOINTMENT (OUTPATIENT)
Dept: RHEUMATOLOGY | Facility: CLINIC | Age: 76
End: 2021-05-28
Payer: COMMERCIAL

## 2021-05-28 VITALS
BODY MASS INDEX: 22.53 KG/M2 | DIASTOLIC BLOOD PRESSURE: 70 MMHG | OXYGEN SATURATION: 98 % | HEART RATE: 54 BPM | HEIGHT: 64 IN | RESPIRATION RATE: 17 BRPM | SYSTOLIC BLOOD PRESSURE: 130 MMHG | TEMPERATURE: 98 F | WEIGHT: 132 LBS

## 2021-05-28 DIAGNOSIS — M25.511 PAIN IN RIGHT SHOULDER: ICD-10-CM

## 2021-05-28 DIAGNOSIS — M25.512 PAIN IN RIGHT SHOULDER: ICD-10-CM

## 2021-05-28 DIAGNOSIS — R76.8 OTHER SPECIFIED ABNORMAL IMMUNOLOGICAL FINDINGS IN SERUM: ICD-10-CM

## 2021-05-28 DIAGNOSIS — G89.29 PAIN IN RIGHT SHOULDER: ICD-10-CM

## 2021-05-28 PROCEDURE — 99215 OFFICE O/P EST HI 40 MIN: CPT

## 2021-05-28 PROCEDURE — 99072 ADDL SUPL MATRL&STAF TM PHE: CPT

## 2021-05-31 NOTE — HISTORY OF PRESENT ILLNESS
[FreeTextEntry1] : 75-year-old man for followup office visit regarding his joint symptoms and rheumatic diseases, including osteoarthritis, chondrocalcinosis, remote history of PMR.\par \par He describes pain bilateral hips, shoulders, better with tolmetin. Morning stiffness 30 minutes. He denies headache, jaw claudication, visual symptoms. He also has his chronic right wrist pain and he is planning surgery with his hand surgeon at [facility] in the future. He denies recent dry eyes and dry mouth. On further questioning, he eats pork as well cooked and denies  raw beef indigestion.  He continues his calcium supplement.Patient denies rash or side effects with their medications.  Patient is content with their current medications.\par \par PMH: Status post corona virus vaccine--without complication

## 2021-05-31 NOTE — ASSESSMENT
[FreeTextEntry1] : Impression: 75-year-old man for followup office visit regarding his joint symptoms and rheumatic diseases, including osteoarthritis, chondrocalcinosis, remote history of PMR.\par \par Patient has been having some increased pain bilateral shoulders and hips recently secondary to a combination of his osteoarthritis and bilateral trochanteric bursitis. Of note, his sedimentation rate was 2 and his CRP was normal, going against a diagnosis of recurrent PMR. He denies recent paresthesias of his fingers secondary to his carpal tunnel syndrome being under better control. He has some mild diffuse swelling of his fingers--consider early scleroderma--I am ordering serologies. He also has dry eyes--consider Sjogren syndrome, and evaluate for sarcoidosis, hepatitis C.  He continues his calcium supplement.\par \par Plan: I reviewed previous lab results with patient\par I reviewed his x-ray results with the patient\par Laboratory tests ordered today-see list below\par Diagnosis and prognosis discussed\par Continue other current medications (other than those changed below)\par Increased tolmetin 600 mg t.i.d. at end of meals x10 days, then back to b.i.d. (Possible side effects explained including cardiovascular risk/MI/CVA)--when he runs out, he will Discontinue tolmetin which is not available anymore\par He will then start sulindac 200 mg b.i.d. at end of breakfast and supper(Possible side effects explained including cardiovascular risk/MI/CVA)\par Artificial tears one drop each eye q.i.d. and p.r.n.(Possible side effects explained)\par Biotin mouthwash/spray q.i.d. and p.r.n.(Possible side effects explained)\par Oral hydration\par Return visit 3 months\par

## 2021-05-31 NOTE — CONSULT LETTER
[Dear  ___] : Dear  [unfilled], [Consult Letter:] : I had the pleasure of evaluating your patient, [unfilled]. [Please see my note below.] : Please see my note below. [Consult Closing:] : Thank you very much for allowing me to participate in the care of this patient.  If you have any questions, please do not hesitate to contact me. [Sincerely,] : Sincerely, [DrAnnalise  ___] : Dr. THOMAS [FreeTextEntry3] : Kirill\par Myron I. Kleiner, M.D., [facility]\par

## 2021-05-31 NOTE — PHYSICAL EXAM
[General Appearance - Alert] : alert [General Appearance - In No Acute Distress] : in no acute distress [General Appearance - Well Nourished] : well nourished [General Appearance - Well Developed] : well developed [General Appearance - Well-Appearing] : healthy appearing [Sclera] : the sclera and conjunctiva were normal [PERRL With Normal Accommodation] : pupils were equal in size, round, and reactive to light [Extraocular Movements] : extraocular movements were intact [Outer Ear] : the ears and nose were normal in appearance [Neck Appearance] : the appearance of the neck was normal [Neck Cervical Mass (___cm)] : no neck mass was observed [Jugular Venous Distention Increased] : there was no jugular-venous distention [Thyroid Diffuse Enlargement] : the thyroid was not enlarged [Thyroid Nodule] : there were no palpable thyroid nodules [Lungs Percussion] : the lungs were normal to percussion [Heart Rate And Rhythm] : heart rate was normal and rhythm regular [Edema] : there was no peripheral edema [Abdomen Soft] : soft [Abdomen Tenderness] : non-tender [Cervical Lymph Nodes Enlarged Posterior Bilaterally] : posterior cervical [Abdomen Mass (___ Cm)] : no abdominal mass palpated [Cervical Lymph Nodes Enlarged Anterior Bilaterally] : anterior cervical [Supraclavicular Lymph Nodes Enlarged Bilaterally] : supraclavicular [Axillary Lymph Nodes Enlarged Bilaterally] : axillary [No CVA Tenderness] : no ~M costovertebral angle tenderness [No Spinal Tenderness] : no spinal tenderness [Skin Color & Pigmentation] : normal skin color and pigmentation [] : no rash [Cranial Nerves] : cranial nerves 2-12 were intact [Deep Tendon Reflexes (DTR)] : deep tendon reflexes were 2+ and symmetric [Sensation] : the sensory exam was normal to light touch and pinprick [Motor Exam] : the motor exam was normal [No Focal Deficits] : no focal deficits [Oriented To Time, Place, And Person] : oriented to person, place, and time [Impaired Insight] : insight and judgment were intact [Affect] : the affect was normal [Mood] : the mood was normal [FreeTextEntry1] : Strength-5/5

## 2021-08-18 ENCOUNTER — APPOINTMENT (OUTPATIENT)
Dept: DERMATOLOGY | Facility: CLINIC | Age: 76
End: 2021-08-18
Payer: COMMERCIAL

## 2021-08-18 PROCEDURE — 99213 OFFICE O/P EST LOW 20 MIN: CPT

## 2021-08-24 ENCOUNTER — APPOINTMENT (OUTPATIENT)
Dept: RHEUMATOLOGY | Facility: CLINIC | Age: 76
End: 2021-08-24
Payer: COMMERCIAL

## 2021-08-24 VITALS
BODY MASS INDEX: 22.88 KG/M2 | HEART RATE: 68 BPM | SYSTOLIC BLOOD PRESSURE: 120 MMHG | TEMPERATURE: 98 F | DIASTOLIC BLOOD PRESSURE: 70 MMHG | RESPIRATION RATE: 17 BRPM | WEIGHT: 134 LBS | OXYGEN SATURATION: 98 % | HEIGHT: 64 IN

## 2021-08-24 DIAGNOSIS — M25.551 PAIN IN RIGHT HIP: ICD-10-CM

## 2021-08-24 DIAGNOSIS — M21.922 UNSPECIFIED ACQUIRED DEFORMITY OF LEFT UPPER ARM: ICD-10-CM

## 2021-08-24 DIAGNOSIS — M25.552 PAIN IN RIGHT HIP: ICD-10-CM

## 2021-08-24 PROCEDURE — 99214 OFFICE O/P EST MOD 30 MIN: CPT

## 2021-08-24 RX ORDER — TOLMETIN SODIUM 600 MG/1
600 TABLET, FILM COATED ORAL
Qty: 270 | Refills: 0 | Status: DISCONTINUED | COMMUNITY
End: 2021-08-24

## 2021-09-04 ENCOUNTER — NON-APPOINTMENT (OUTPATIENT)
Age: 76
End: 2021-09-04

## 2021-09-14 ENCOUNTER — APPOINTMENT (OUTPATIENT)
Dept: PLASTIC SURGERY | Facility: CLINIC | Age: 76
End: 2021-09-14

## 2021-11-09 ENCOUNTER — APPOINTMENT (OUTPATIENT)
Dept: PLASTIC SURGERY | Facility: CLINIC | Age: 76
End: 2021-11-09
Payer: COMMERCIAL

## 2021-11-09 VITALS — SYSTOLIC BLOOD PRESSURE: 148 MMHG | DIASTOLIC BLOOD PRESSURE: 83 MMHG

## 2021-11-09 VITALS
TEMPERATURE: 97.3 F | RESPIRATION RATE: 14 BRPM | BODY MASS INDEX: 25.58 KG/M2 | OXYGEN SATURATION: 97 % | WEIGHT: 139 LBS | SYSTOLIC BLOOD PRESSURE: 149 MMHG | HEART RATE: 60 BPM | DIASTOLIC BLOOD PRESSURE: 78 MMHG | HEIGHT: 62 IN

## 2021-11-09 PROCEDURE — 99203 OFFICE O/P NEW LOW 30 MIN: CPT

## 2021-11-11 ENCOUNTER — APPOINTMENT (OUTPATIENT)
Dept: RHEUMATOLOGY | Facility: CLINIC | Age: 76
End: 2021-11-11
Payer: COMMERCIAL

## 2021-11-11 VITALS
SYSTOLIC BLOOD PRESSURE: 130 MMHG | DIASTOLIC BLOOD PRESSURE: 70 MMHG | HEART RATE: 73 BPM | HEIGHT: 62 IN | OXYGEN SATURATION: 98 % | WEIGHT: 135 LBS | RESPIRATION RATE: 17 BRPM | TEMPERATURE: 97.9 F | BODY MASS INDEX: 24.84 KG/M2

## 2021-11-11 DIAGNOSIS — M50.20 OTHER CERVICAL DISC DISPLACEMENT, UNSPECIFIED CERVICAL REGION: ICD-10-CM

## 2021-11-11 DIAGNOSIS — M79.89 OTHER SPECIFIED SOFT TISSUE DISORDERS: ICD-10-CM

## 2021-11-11 DIAGNOSIS — G89.29 PAIN IN LEFT SHOULDER: ICD-10-CM

## 2021-11-11 DIAGNOSIS — M25.512 PAIN IN LEFT SHOULDER: ICD-10-CM

## 2021-11-11 PROCEDURE — 99215 OFFICE O/P EST HI 40 MIN: CPT | Mod: 25

## 2021-11-11 PROCEDURE — G2212 PROLONG OUTPT/OFFICE VIS: CPT

## 2021-11-15 NOTE — ADDENDUM
[FreeTextEntry1] : I, Franca Ramirez, acted solely as a scribe for Dr. Myron I. Kleiner, MD. on 11/11/2021 .\par \par All medical record entries made by the Scribe were at Dr. Myron I. Kleiner, MD, direction and personally dictated by me on 11/11/2021. I have personally reviewed the chart and agree that the record accurately reflects my personal performance of the history, physical exam, assessment and plan.

## 2021-11-15 NOTE — CONSULT LETTER
[Dear  ___] : Dear  [unfilled], [Consult Letter:] : I had the pleasure of evaluating your patient, [unfilled]. [Please see my note below.] : Please see my note below. [Consult Closing:] : Thank you very much for allowing me to participate in the care of this patient.  If you have any questions, please do not hesitate to contact me. [Sincerely,] : Sincerely, [DrAnnalise  ___] : Dr. THOMAS [FreeTextEntry3] : Kirill\par Myron I. Kleiner, M.D., FACR\par Chief, Division of Rheumatology\par Department of Medicine\par Health system

## 2021-11-15 NOTE — PHYSICAL EXAM
[General Appearance - Alert] : alert [General Appearance - In No Acute Distress] : in no acute distress [General Appearance - Well Nourished] : well nourished [General Appearance - Well Developed] : well developed [General Appearance - Well-Appearing] : healthy appearing [Sclera] : the sclera and conjunctiva were normal [PERRL With Normal Accommodation] : pupils were equal in size, round, and reactive to light [Extraocular Movements] : extraocular movements were intact [Outer Ear] : the ears and nose were normal in appearance [Neck Appearance] : the appearance of the neck was normal [Neck Cervical Mass (___cm)] : no neck mass was observed [Jugular Venous Distention Increased] : there was no jugular-venous distention [Thyroid Diffuse Enlargement] : the thyroid was not enlarged [Thyroid Nodule] : there were no palpable thyroid nodules [No CVA Tenderness] : no ~M costovertebral angle tenderness [No Spinal Tenderness] : no spinal tenderness [Motor Exam] : the motor exam was normal [No Focal Deficits] : no focal deficits [Lungs Percussion] : the lungs were normal to percussion [Heart Rate And Rhythm] : heart rate was normal and rhythm regular [Edema] : there was no peripheral edema [Abdomen Soft] : soft [Abdomen Tenderness] : non-tender [Abdomen Mass (___ Cm)] : no abdominal mass palpated [Cervical Lymph Nodes Enlarged Posterior Bilaterally] : posterior cervical [Cervical Lymph Nodes Enlarged Anterior Bilaterally] : anterior cervical [Supraclavicular Lymph Nodes Enlarged Bilaterally] : supraclavicular [Axillary Lymph Nodes Enlarged Bilaterally] : axillary [Skin Color & Pigmentation] : normal skin color and pigmentation [Skin Turgor] : normal skin turgor [] : no rash [Cranial Nerves] : cranial nerves 2-12 were intact [Deep Tendon Reflexes (DTR)] : deep tendon reflexes were 2+ and symmetric [Sensation] : the sensory exam was normal to light touch and pinprick [Oriented To Time, Place, And Person] : oriented to person, place, and time [Impaired Insight] : insight and judgment were intact [Affect] : the affect was normal [Mood] : the mood was normal [FreeTextEntry1] : Strength-5/5

## 2021-11-15 NOTE — ASSESSMENT
[FreeTextEntry1] : Impression: JOEL ECHEVARRIA is a 75 year old man who presents for follow up office visit for further evaluation of joint symptoms and rheumatic diseases including osteoarthritis, chondrocalcinosis, remote history of PMR.\par \par Patient is feeling "good". 2 months ago, he started physical therapy for his left shoulder with relief. He has some pain on extreme range of motion.  The MRI of the left shoulder revealed rotator cuff tendinosis, old rotator cuff repair, small chronic tear, trace bursitis, osteoarthritis AC joint.  He also has some low back pain without radiation to the lower extremities.  His osteoarthritis appears stable.  In view of the questionable synovial proliferation bilateral PIPs and other joint findings and the questionable history of psoriasis according to the patient, also consider the possibility of psoriatic arthritis.  I am obtaining x-rays to reevaluate.  Persistent right wrist pain - will be visiting hand surgeon today. Denies recent headache, jaw claudication, or visual symptoms. On exam, patient has dry eyes but not bothersome--I will continue to monitor that and consider Sjogren's syndrome.. Patient denies rash or side effects with current medications. Patient is content with current medication regimen. \par \par Plan: I reviewed his recent lab results with extensive discussion with the patient\par I reviewed previous MRI imaging results with extensive discussion\par X-rays ordered - see list below \par Laboratory tests ordered today - see list below\par Diagnosis and prognosis discussed\par Continue other current medications except for changes noted below\par Artificial tears one drop each eye q.i.d. and p.r.n.(Possible side effects explained)\par Biotin mouthwash/spray q.i.d. and p.r.n.(Possible side effects explained)\par Oral hydration\par Patient declined change or addition of any medications at this time\par I requested that he have the hand surgeon send me consultation report\par Continue physical therapy\par Return visit 3 months\par Total time for this office visit, including face-to-face time and nonface-to-face time, 85 minutes--- including review the chart and previous records, review of previous imaging reports, review of his recent MRI report left shoulder with extensive discussion with the patient, review of his progress with physical therapy for his left shoulder with extensive discussion, ordering of new x-rays including their need and consequences, detailed medication history, review of his medications going forward with their possible side effects, review of the impact of his rheumatic disease on his other medical problems, reviewed the impact of his other medical problems on his rheumatic disease

## 2021-11-15 NOTE — HISTORY OF PRESENT ILLNESS
[FreeTextEntry1] : JOEL ECHEVARRIA is a 75 year old man who presents for follow up office visit for further evaluation of joint symptoms and rheumatic diseases including osteoarthritis, chondrocalcinosis, remote history of PMR.\par \par Patient is feeling "good". He started attending physical therapy 2 months ago for his left shoulder with relief. He has some pain on extreme range of motion. He also has some low back pain without radiation to the lower extremities. Denies recent headache, jaw claudication, or visual symptoms. Persistent right wrist pain - will be visiting hand surgeon today. Denies dry eyes and dry mouth. Patient denies rash or side effects with current medications. Patient is content with current medication regimen. \par \par PMH:\par Plastic Surgeon visit 1 day ago regarding lipoma on posterior right neck since August - planning to have it removed

## 2021-11-23 ENCOUNTER — APPOINTMENT (OUTPATIENT)
Dept: MRI IMAGING | Facility: CLINIC | Age: 76
End: 2021-11-23
Payer: COMMERCIAL

## 2021-11-23 ENCOUNTER — OUTPATIENT (OUTPATIENT)
Dept: OUTPATIENT SERVICES | Facility: HOSPITAL | Age: 76
LOS: 1 days | End: 2021-11-23

## 2021-11-23 DIAGNOSIS — R22.1 LOCALIZED SWELLING, MASS AND LUMP, NECK: ICD-10-CM

## 2021-11-23 PROCEDURE — 70543 MRI ORBT/FAC/NCK W/O &W/DYE: CPT | Mod: 26

## 2021-11-29 ENCOUNTER — TRANSCRIPTION ENCOUNTER (OUTPATIENT)
Age: 76
End: 2021-11-29

## 2021-12-01 NOTE — ASSESSMENT
[FreeTextEntry1] : 76 yo male with posterior neck mass presents to discuss excision\par discussed with patient that he should have a MRI first to further evaluate the mass. Possible need for FNA depending on MRI results\par follow up after MRI

## 2021-12-01 NOTE — PHYSICAL EXAM
[NI] : Normal [de-identified] : 6 cm x 6 cm posterior neck mass, subfascial, fixed, no overlying skin changes

## 2021-12-01 NOTE — HISTORY OF PRESENT ILLNESS
[FreeTextEntry1] : Mr. JOEL ECHEVARRIA is a 75 year old male with past medical history of psoriasis who presents to discuss a mass on his neck which has been present for many months.  Pt states that the mass on his neck has been slowly growing and is becoming increasingly more painful pt is worried about it's etiology. \par \par non smoker\par no anticoagulation or bleeding disorders \par

## 2022-02-20 ENCOUNTER — RESULT REVIEW (OUTPATIENT)
Age: 77
End: 2022-02-20

## 2022-02-21 ENCOUNTER — APPOINTMENT (OUTPATIENT)
Dept: PLASTIC SURGERY | Facility: AMBULATORY SURGERY CENTER | Age: 77
End: 2022-02-21
Payer: COMMERCIAL

## 2022-02-21 PROCEDURE — 21554 EXC NECK TUM DEEP 5 CM/>: CPT

## 2022-02-21 PROCEDURE — 14040 TIS TRNFR F/C/C/M/N/A/G/H/F: CPT

## 2022-02-23 ENCOUNTER — APPOINTMENT (OUTPATIENT)
Dept: RHEUMATOLOGY | Facility: CLINIC | Age: 77
End: 2022-02-23
Payer: COMMERCIAL

## 2022-02-23 VITALS
BODY MASS INDEX: 24.84 KG/M2 | HEIGHT: 62 IN | OXYGEN SATURATION: 99 % | TEMPERATURE: 98.1 F | RESPIRATION RATE: 17 BRPM | WEIGHT: 135 LBS | SYSTOLIC BLOOD PRESSURE: 124 MMHG | HEART RATE: 52 BPM | DIASTOLIC BLOOD PRESSURE: 78 MMHG

## 2022-02-23 DIAGNOSIS — M25.531 PAIN IN RIGHT WRIST: ICD-10-CM

## 2022-02-23 PROCEDURE — 99215 OFFICE O/P EST HI 40 MIN: CPT

## 2022-02-23 RX ORDER — COVID-19 ANTIGEN TEST
KIT MISCELLANEOUS
Qty: 6 | Refills: 0 | Status: DISCONTINUED | COMMUNITY
Start: 2022-01-15

## 2022-02-23 RX ORDER — OXYCODONE 5 MG/1
5 TABLET ORAL
Qty: 10 | Refills: 0 | Status: DISCONTINUED | COMMUNITY
Start: 2022-02-17 | End: 2022-02-23

## 2022-02-23 RX ORDER — ACYCLOVIR 50 MG/G
5 OINTMENT TOPICAL
Qty: 15 | Refills: 0 | Status: ACTIVE | COMMUNITY
Start: 2021-09-08

## 2022-02-23 RX ORDER — BENZONATATE 200 MG/1
200 CAPSULE ORAL
Qty: 30 | Refills: 0 | Status: DISCONTINUED | COMMUNITY
Start: 2021-11-29

## 2022-02-28 ENCOUNTER — APPOINTMENT (OUTPATIENT)
Dept: DERMATOLOGY | Facility: CLINIC | Age: 77
End: 2022-02-28
Payer: COMMERCIAL

## 2022-02-28 PROCEDURE — 99214 OFFICE O/P EST MOD 30 MIN: CPT

## 2022-03-06 RX ORDER — SULINDAC 200 MG/1
200 TABLET ORAL
Qty: 180 | Refills: 0 | Status: DISCONTINUED | COMMUNITY
Start: 2021-05-31 | End: 2022-03-06

## 2022-03-06 NOTE — REVIEW OF SYSTEMS
[As Noted in HPI] : as noted in HPI [Negative] : Heme/Lymph [Joint Pain] : joint pain [Dry Eyes] : no dryness of the eyes

## 2022-03-06 NOTE — ADDENDUM
[FreeTextEntry1] : I, Franca Ramirez, acted solely as a scribe for Dr. Myron I. Kleiner, MD. on 02/23/2022 .

## 2022-03-06 NOTE — ASSESSMENT
[FreeTextEntry1] : Impression: JOEL ECHEVARRIA is a 75 year old man who presents for follow up office visit for further evaluation of joint symptoms and rheumatic diseases including osteoarthritis, chondrocalcinosis, remote history of PMR, Raynaud's.\par \par Patient feels fairly well. Pain base of left thumb and right wrist secondary to his osteoarthritis with chondrocalcinosis. New hand surgeon gave steroid injections with some relief. Denies left shoulder or low back pain. Recent x-rays did reveal osteoarthritis in various joints. Denies recent headache, jaw claudication, or visual symptoms. Denies dry eyes and dry mouth. On further, questioning, patient denies fingers changing color on cold exposure, however, he was having a mild episode of Raynaud's on exam today. The patient continues calcium. Patient denies rash or side effects with current medications. Patient is content with current medication regimen. \par \par Plan: I reviewed recent lab results with extensive discussion with the patient\par I reviewed recent x-ray results with extensive discussion with the patient\par Laboratory tests ordered today - see list below\par Diagnosis and prognosis discussed\par Continue current medications (other than those changed below) \par Discontinue Sulindac\par Piroxicam 10 mg q.d. end of breakfast (Possible side effects explained including cardiovascular risk/MI/CVA) \par Artificial tears one drop each eye q.i.d. and p.r.n.(Possible side effects explained)\par Biotene mouthwash/spray q.i.d. and p.r.n.(Possible side effects explained) \par Oral Hydration\par Keep hands and feet and torso warm - patient warned of dangers of gangrene/digital amputation with Raynaud's \par Return visit 3 months

## 2022-03-06 NOTE — CONSULT LETTER
[Dear  ___] : Dear  [unfilled], [Consult Letter:] : I had the pleasure of evaluating your patient, [unfilled]. [Please see my note below.] : Please see my note below. [Consult Closing:] : Thank you very much for allowing me to participate in the care of this patient.  If you have any questions, please do not hesitate to contact me. [Sincerely,] : Sincerely, [DrAnnalise  ___] : Dr. THOMAS [FreeTextEntry3] : Kirill\par Myron I. Kleiner, M.D., FACR\par Chief, Division of Rheumatology\par Department of Medicine\par [facility] [DrAnnalise ___] : Dr. THOMAS

## 2022-03-06 NOTE — PHYSICAL EXAM
[General Appearance - Alert] : alert [General Appearance - In No Acute Distress] : in no acute distress [General Appearance - Well Nourished] : well nourished [General Appearance - Well Developed] : well developed [General Appearance - Well-Appearing] : healthy appearing [Sclera] : the sclera and conjunctiva were normal [PERRL With Normal Accommodation] : pupils were equal in size, round, and reactive to light [Extraocular Movements] : extraocular movements were intact [Outer Ear] : the ears and nose were normal in appearance [Neck Appearance] : the appearance of the neck was normal [Neck Cervical Mass (___cm)] : no neck mass was observed [Jugular Venous Distention Increased] : there was no jugular-venous distention [Thyroid Diffuse Enlargement] : the thyroid was not enlarged [Thyroid Nodule] : there were no palpable thyroid nodules [Lungs Percussion] : the lungs were normal to percussion [Heart Rate And Rhythm] : heart rate was normal and rhythm regular [Edema] : there was no peripheral edema [Abdomen Tenderness] : non-tender [Abdomen Soft] : soft [Abdomen Mass (___ Cm)] : no abdominal mass palpated [Cervical Lymph Nodes Enlarged Posterior Bilaterally] : posterior cervical [Cervical Lymph Nodes Enlarged Anterior Bilaterally] : anterior cervical [Supraclavicular Lymph Nodes Enlarged Bilaterally] : supraclavicular [Axillary Lymph Nodes Enlarged Bilaterally] : axillary [No CVA Tenderness] : no ~M costovertebral angle tenderness [No Spinal Tenderness] : no spinal tenderness [Skin Color & Pigmentation] : normal skin color and pigmentation [Skin Turgor] : normal skin turgor [] : no rash [Cranial Nerves] : cranial nerves 2-12 were intact [Deep Tendon Reflexes (DTR)] : deep tendon reflexes were 2+ and symmetric [Sensation] : the sensory exam was normal to light touch and pinprick [Motor Exam] : the motor exam was normal [No Focal Deficits] : no focal deficits [Oriented To Time, Place, And Person] : oriented to person, place, and time [Impaired Insight] : insight and judgment were intact [Affect] : the affect was normal [Mood] : the mood was normal [FreeTextEntry1] : Strength-5/5

## 2022-03-06 NOTE — HISTORY OF PRESENT ILLNESS
[FreeTextEntry1] : JOEL ECHEVARRIA is a 75 year old man who presents for follow up office visit for further evaluation of joint symptoms and rheumatic diseases including osteoarthritis, chondrocalcinosis, remote history of PMR.\par \par Patient feels fairly well. Pain base of left thumb and right wrist. New hand surgeon at [facility] Dr. Jigna Guzmán  gave steroid injections with some relief. Denies left shoulder pain--Completed physical therapy but continues exercises at the gym. Denies low back pain. Denies recent headache, jaw claudication, or visual symptoms. Denies dry eyes and dry mouth. On further, questioning, patient denies fingers changing color on cold exposure. The patient continues calcium. Patient denies rash or side effects with current medications. Patient is content with current medication regimen. \par \par PMH;\par New Hand Surgeon Dr.Samantha Guzmán - suggests surgery to remove a bone in  right wrist and base left thumb\par \par PSH:\par 2 days ago - Lipoma removal from his neck via Dr.Adam Landrum without complications

## 2022-03-08 ENCOUNTER — APPOINTMENT (OUTPATIENT)
Dept: PLASTIC SURGERY | Facility: CLINIC | Age: 77
End: 2022-03-08
Payer: COMMERCIAL

## 2022-03-08 VITALS
BODY MASS INDEX: 26.5 KG/M2 | TEMPERATURE: 97.4 F | RESPIRATION RATE: 17 BRPM | HEIGHT: 62 IN | HEART RATE: 64 BPM | OXYGEN SATURATION: 98 % | SYSTOLIC BLOOD PRESSURE: 151 MMHG | WEIGHT: 144 LBS | DIASTOLIC BLOOD PRESSURE: 81 MMHG

## 2022-03-08 VITALS — DIASTOLIC BLOOD PRESSURE: 81 MMHG | SYSTOLIC BLOOD PRESSURE: 149 MMHG

## 2022-03-08 PROCEDURE — 99024 POSTOP FOLLOW-UP VISIT: CPT

## 2022-03-08 NOTE — PHYSICAL EXAM
[NI] : Normal [de-identified] : incision c/d/i\par no palpable fluid collections or signs of infection\par

## 2022-03-08 NOTE — ASSESSMENT
[FreeTextEntry1] : 75 yo male s/p excision of neck mass 2/21/22\par pathology benign\par no restrictions\par discussed sun screen at todays visit as well as scar massages\par all questions answered\par

## 2022-03-08 NOTE — HISTORY OF PRESENT ILLNESS
[FreeTextEntry1] : Mr. JOEL ECHEVARRIA is a 75 year old male with past medical history of psoriasis who presents to discuss a mass on his neck which has been present for many months.  Pt is now s/p excision of neck mass 2/18/22\par pathology: lipoma\par denies fever, chills, LE pain/edema\par

## 2022-05-03 ENCOUNTER — NON-APPOINTMENT (OUTPATIENT)
Age: 77
End: 2022-05-03

## 2022-05-22 ENCOUNTER — RX RENEWAL (OUTPATIENT)
Age: 77
End: 2022-05-22

## 2022-06-01 ENCOUNTER — APPOINTMENT (OUTPATIENT)
Dept: RHEUMATOLOGY | Facility: CLINIC | Age: 77
End: 2022-06-01
Payer: COMMERCIAL

## 2022-06-01 VITALS
SYSTOLIC BLOOD PRESSURE: 134 MMHG | HEIGHT: 62 IN | TEMPERATURE: 98 F | OXYGEN SATURATION: 98 % | HEART RATE: 60 BPM | BODY MASS INDEX: 25.03 KG/M2 | WEIGHT: 136 LBS | DIASTOLIC BLOOD PRESSURE: 84 MMHG | RESPIRATION RATE: 17 BRPM

## 2022-06-01 DIAGNOSIS — M25.511 PAIN IN RIGHT SHOULDER: ICD-10-CM

## 2022-06-01 DIAGNOSIS — G89.29 PAIN IN RIGHT SHOULDER: ICD-10-CM

## 2022-06-01 PROCEDURE — 99215 OFFICE O/P EST HI 40 MIN: CPT

## 2022-06-01 RX ORDER — KETOCONAZOLE 20.5 MG/ML
2 SHAMPOO, SUSPENSION TOPICAL
Qty: 360 | Refills: 0 | Status: ACTIVE | COMMUNITY
Start: 2022-02-28

## 2022-06-01 RX ORDER — PIROXICAM 10 MG/1
10 CAPSULE ORAL
Qty: 90 | Refills: 0 | Status: DISCONTINUED | COMMUNITY
Start: 2022-02-23 | End: 2022-06-01

## 2022-06-19 NOTE — HISTORY OF PRESENT ILLNESS
[FreeTextEntry1] : JOEL ECHEVARRIA is a 76 year old man who presents for follow up office visit for further evaluation of joint symptoms and rheumatic diseases including osteoarthritis, chondrocalcinosis, Raynaud's and remote history of PMR.\par \par 6 weeks ago, patient injured right shoulder and had an MRI (patient handed me the report in office) which revealed rotator cuff tear. Followed up Orthopedics Dr. Pagan at [facility] and planning repair August 3, 2022. Today, he has persistent pain right wrist - following  and planning fusion or surgery to remove a bone. No recent Raynaud's episodes. Denies recent headache, jaw claudication, or visual symptoms. Patient denies rash or side effects with current medications. Patient is content with current medication regimen. \par \par SH: Next month--going to [place]

## 2022-06-19 NOTE — CONSULT LETTER
[Dear  ___] : Dear  [unfilled], [Consult Letter:] : I had the pleasure of evaluating your patient, [unfilled]. [Please see my note below.] : Please see my note below. [Consult Closing:] : Thank you very much for allowing me to participate in the care of this patient.  If you have any questions, please do not hesitate to contact me. [Sincerely,] : Sincerely, [DrAnnalise  ___] : Dr. THOMAS [DrAnnalise ___] : Dr. THOMAS [FreeTextEntry3] : Kirill\par Myron I. Kleiner, M.D., FACR\par Chief, Division of Rheumatology\par Department of Medicine\par [facility]

## 2022-06-19 NOTE — ADDENDUM
How Severe Are Your Bumps?: moderate
[FreeTextEntry1] : I, Franca Ramirez, acted solely as a scribe for Dr. Myron I. Kleiner, MD. on 06/01/2022. 
Have Your Bumps Been Treated?: not been treated
Is This A New Presentation, Or A Follow-Up?: Bumps

## 2022-06-19 NOTE — ASSESSMENT
[FreeTextEntry1] : Impression: JOEL ECHEVARRIA is a 76 year old man who presents for follow up office visit for further evaluation of joint symptoms and rheumatic diseases including osteoarthritis, chondrocalcinosis, Raynaud's and remote history of PMR.\par \par 6 weeks ago, patient injured right shoulder - MRI revealed rotator cuff tear. Orthopedics planning repair August 3, 2022. Today, he has persistent pain right wrist secondary to his osteoarthritis with chondrocalcinosis - following  and planning fusion or surgery to remove a bone. On exam, patient also has right bicipital tendonitis. No recent Raynaud's episodes. Denies recent headache, jaw claudication, or visual symptoms with his remote history of PMR remaining under good control. Patient denies rash or side effects with current medications. Patient is content with current medication regimen. \par \par Plan: I reviewed recent lab results with patient with extensive discussion\par I reviewed results of MRI right shoulder which patient handed to me during the office visit\par I reviewed pictures of x-rays which patient handed to me during office visit of his right wrist\par Laboratory tests ordered - see list below - with coordination of care \par Diagnosis and prognosis discussed\par Continue current medications (other than those changed below) \par Discontinue Piroxicam \par Oxaprozin 600 mg b.i.d end of breakfast and supper (Possible side effects explained including cardiovascular risk/MI/CVA) \par Discontinue Oxaprozin 1 week before surgery and restart when Orthopedics has cleared for adequate healing after surgery\par Tylenol 1000 mg t.i.d. p.r.n. joint pain or Tylenol 1300 mg b.i.d. p.r.n. joint pain (possible side effects explained)\par Artificial tears one drop each eye q.i.d. and p.r.n.(Possible side effects explained)\par Biotene mouthwash/spray q.i.d. and p.r.n.(Possible side effects explained) \par Oral Hydration\par Keep hands and feet and torso warm - patient warned of dangers of gangrene/digital amputation with Raynaud's \par Return visit 3 months

## 2022-08-24 ENCOUNTER — APPOINTMENT (OUTPATIENT)
Dept: RHEUMATOLOGY | Facility: CLINIC | Age: 77
End: 2022-08-24

## 2022-09-03 ENCOUNTER — RX RENEWAL (OUTPATIENT)
Age: 77
End: 2022-09-03

## 2022-10-17 ENCOUNTER — APPOINTMENT (OUTPATIENT)
Dept: RHEUMATOLOGY | Facility: CLINIC | Age: 77
End: 2022-10-17

## 2022-10-17 VITALS
DIASTOLIC BLOOD PRESSURE: 76 MMHG | HEART RATE: 74 BPM | SYSTOLIC BLOOD PRESSURE: 120 MMHG | TEMPERATURE: 98.2 F | OXYGEN SATURATION: 98 %

## 2022-10-17 DIAGNOSIS — M21.931 UNSPECIFIED ACQUIRED DEFORMITY OF RIGHT FOREARM: ICD-10-CM

## 2022-10-17 DIAGNOSIS — M21.932 UNSPECIFIED ACQUIRED DEFORMITY OF RIGHT FOREARM: ICD-10-CM

## 2022-10-17 PROCEDURE — 99214 OFFICE O/P EST MOD 30 MIN: CPT

## 2022-10-22 NOTE — ADDENDUM
[FreeTextEntry1] : I, Franca Ramirez, acted solely as a scribe for Dr. Myron I. Kleiner, MD. on 10/17/2022.

## 2022-10-22 NOTE — ASSESSMENT
[FreeTextEntry1] : Impression: JOEL ECHEVARRIA is a 76 year old man who presents for follow up office visit for further evaluation of joint symptoms and rheumatic diseases including osteoarthritis, chondrocalcinosis, Raynaud's and remote history of PMR.\par \par Patient feels fairly well, although he has some pain right wrist secondary to his osteoarthritis with chondrocalcinosis. His ostearthritis otherwise remains under good control. S/P Right Rotator Cuff Repair 2 months ago without complication.  Doing physical therapy.  Denies recent headache, jaw claudication, or visual symptoms with his remote history of PMR remaining under control. No recent Raynaud's episodes. Patient denies rash or side effects with current medications. Patient is content with current medication regimen. \par \par Plan: I reviewed recent lab results with patient with extensive discussion\par Laboratory tests ordered - see list below - with coordination of care \par Diagnosis and prognosis discussed\par Continue current medications (other than those changed below) \par Patient declined any changes or additions to medication regimen \par Tylenol 1000 mg t.i.d. p.r.n. joint pain or Tylenol 1300 mg b.i.d. p.r.n. joint pain (possible side effects explained)\par Artificial tears one drop each eye q.i.d. and p.r.n.(Possible side effects explained)\par Biotene mouthwash/spray q.i.d. and p.r.n.(Possible side effects explained) \par Oral Hydration\par Patient declines oral medication for dryness \par Keep hands, feet and torso warm - patient warned of dangers of gangrene/digital amputation with Raynaud's \par Return visit 3 months

## 2022-10-22 NOTE — CONSULT LETTER
[Dear  ___] : Dear  [unfilled], [Consult Letter:] : I had the pleasure of evaluating your patient, [unfilled]. [Please see my note below.] : Please see my note below. [Consult Closing:] : Thank you very much for allowing me to participate in the care of this patient.  If you have any questions, please do not hesitate to contact me. [Sincerely,] : Sincerely, [DrAnnalise  ___] : Dr. THOMAS [FreeTextEntry3] : Kirill\par Myron I. Kleiner, M.D., FACR\par Chief, Division of Rheumatology\par Department of Medicine\par [facility] [DrAnnalise ___] : Dr. THOMAS

## 2022-10-22 NOTE — HISTORY OF PRESENT ILLNESS
[FreeTextEntry1] : JOEL ECHEVARRIA is a 76 year old man who presents for follow up office visit for further evaluation of joint symptoms and rheumatic diseases including osteoarthritis, chondrocalcinosis, Raynaud's and remote history of PMR.\par \par Patient feels fairly well. He has some pain right wrist. Denies recent headache, jaw claudication, or visual symptoms. No recent Raynaud's episodes. Patient denies rash or side effects with current medications. Patient is content with current medication regimen. \par \par PSH:\par August 4, 2022 - Right rotator cuff repair via Orthopedics Dr.Howard Pagan at [facility]--without complication--- doing physical therapy\par \par PMH: 1 week ago--postnasal drip--- ENT treated Medrol Dosepak with relief

## 2022-10-22 NOTE — PHYSICAL EXAM
[General Appearance - Alert] : alert [General Appearance - In No Acute Distress] : in no acute distress [General Appearance - Well Nourished] : well nourished [General Appearance - Well Developed] : well developed [General Appearance - Well-Appearing] : healthy appearing [Sclera] : the sclera and conjunctiva were normal [PERRL With Normal Accommodation] : pupils were equal in size, round, and reactive to light [Extraocular Movements] : extraocular movements were intact [Outer Ear] : the ears and nose were normal in appearance [Neck Appearance] : the appearance of the neck was normal [Neck Cervical Mass (___cm)] : no neck mass was observed [Jugular Venous Distention Increased] : there was no jugular-venous distention [Thyroid Diffuse Enlargement] : the thyroid was not enlarged [Thyroid Nodule] : there were no palpable thyroid nodules [Lungs Percussion] : the lungs were normal to percussion [Heart Rate And Rhythm] : heart rate was normal and rhythm regular [Edema] : there was no peripheral edema [Abdomen Soft] : soft [Abdomen Tenderness] : non-tender [Abdomen Mass (___ Cm)] : no abdominal mass palpated [No CVA Tenderness] : no ~M costovertebral angle tenderness [No Spinal Tenderness] : no spinal tenderness [Skin Color & Pigmentation] : normal skin color and pigmentation [Skin Turgor] : normal skin turgor [] : no rash [Cranial Nerves] : cranial nerves 2-12 were intact [Deep Tendon Reflexes (DTR)] : deep tendon reflexes were 2+ and symmetric [Sensation] : the sensory exam was normal to light touch and pinprick [Motor Exam] : the motor exam was normal [No Focal Deficits] : no focal deficits [Oriented To Time, Place, And Person] : oriented to person, place, and time [Impaired Insight] : insight and judgment were intact [Affect] : the affect was normal [Mood] : the mood was normal [Cervical Lymph Nodes Enlarged Posterior Bilaterally] : posterior cervical [Cervical Lymph Nodes Enlarged Anterior Bilaterally] : anterior cervical [Supraclavicular Lymph Nodes Enlarged Bilaterally] : supraclavicular [Axillary Lymph Nodes Enlarged Bilaterally] : axillary [FreeTextEntry1] : Shoulders- right abduct 160° active/passive\par Elbows- normal \par Wrists- left flex/extend 60°, mild tenderness/crepitus/bony enlargement left 1st CMC\par Hands- Loretta's/Heberden's nodes \par Hips- bilateral decreased external rotation, right 0° internal rotation\par Knees- bilateral crepitus\par Ankles/Feet- normal; Strength-5/5

## 2022-12-06 ENCOUNTER — APPOINTMENT (OUTPATIENT)
Dept: RHEUMATOLOGY | Facility: CLINIC | Age: 77
End: 2022-12-06

## 2023-01-25 ENCOUNTER — TRANSCRIPTION ENCOUNTER (OUTPATIENT)
Age: 78
End: 2023-01-25

## 2023-02-22 ENCOUNTER — APPOINTMENT (OUTPATIENT)
Dept: RHEUMATOLOGY | Facility: CLINIC | Age: 78
End: 2023-02-22
Payer: COMMERCIAL

## 2023-02-22 VITALS
TEMPERATURE: 98.3 F | SYSTOLIC BLOOD PRESSURE: 150 MMHG | DIASTOLIC BLOOD PRESSURE: 90 MMHG | HEART RATE: 56 BPM | HEIGHT: 62 IN | OXYGEN SATURATION: 98 %

## 2023-02-22 PROCEDURE — 99215 OFFICE O/P EST HI 40 MIN: CPT | Mod: 25

## 2023-02-22 PROCEDURE — 99417 PROLNG OP E/M EACH 15 MIN: CPT

## 2023-02-26 NOTE — CONSULT LETTER
[Dear  ___] : Dear  [unfilled], [Consult Letter:] : I had the pleasure of evaluating your patient, [unfilled]. [Please see my note below.] : Please see my note below. [Consult Closing:] : Thank you very much for allowing me to participate in the care of this patient.  If you have any questions, please do not hesitate to contact me. [Sincerely,] : Sincerely, [DrAnnalise  ___] : Dr. THOMAS [DrAnnalise ___] : Dr. THOMAS [FreeTextEntry3] : Kirill\par Myron I. Kleiner, M.D., FACR\par Chief, Division of Rheumatology\par Department of Medicine\par [facility]

## 2023-02-26 NOTE — ADDENDUM
[FreeTextEntry1] : I, Thierry Leoa, acted solely as a scribe for Dr. Myron I. Kleiner, MD. on 02/22/2023 .\par \par

## 2023-02-26 NOTE — ASSESSMENT
[FreeTextEntry1] : Impression: JOEL ECHEVARRIA is a 76 year old man who presents for follow up office visit for further evaluation of joint symptoms and rheumatic diseases including osteoarthritis, chondrocalcinosis, Raynaud's, and remote history of PMR.\par \par Patient feels fairly well. Reports emesis three weeks ago secondary to taking omeprazole before breakfast. Discontinued for one week on his own, then restarted with meals and symptoms resolved. Patient reports pain right wrist and base of left thumb secondary to osteoarthritis. Patient reports no right shoulder pain since right rotator cuff surgery. No recent Raynaud’s Episodes. Denies recent headache, jaw claudication, or visual symptoms.patient denies recent paresthesias of his right fingers with his right carpal tunnel syndrome quiescent.  The patient continues calcium  supplements. Patient denies rash or side effects with current medications. Patient is content with current medication regimen. Recent laboratory tests results reveal slightly low WBC, but no other significant changes or results, with extensive discussion.\par \par Plan: I reviewed his chart and previous records\par  I reviewed recent lab results with patient with extensive discussion\par Laboratory tests ordered - see list below - with coordination of care \par X-rays ordered – see list below – with coordination of care\par Diagnosis and prognosis discussed\par Continue current medications (other than those changed below) \par Patient declined any changes or additions to medication regimen \par Artificial tears one drop each eye q.i.d. and p.r.n.(Possible side effects explained)\par Biotene mouthwash/spray q.i.d. and p.r.n.(Possible side effects explained) \par Oral Hydration\par Patient declines oral medication for dryness \par Keep hands and feet and torso warm - patient warned of dangers of gangrene/digital amputation with Raynaud's\par Increase home thermostat to at least 72 to 74 °F\par If emesis occurs, call the office and alternative medicines instead of omeprazole will be considered\par Return visit in 4 months\par Total time for this office visit, including face-to-face time and non-face-to-face time, 85 minutes--- including review of the chart and previous records, review of previous lab results with extensive discussion with the patient, ordering lab tests with coordination of care, review of previous imaging reports/x-ray results, ordering of new x-rays with coordination of care, detailed medication history, review of medications going forward with their possible side effects, reviewed the impact of the patient's rheumatic disease on their other medical problems, reviewed the impact of the patient's other medical problems on their rheumatic disease

## 2023-02-26 NOTE — HISTORY OF PRESENT ILLNESS
[FreeTextEntry1] : JOEL ECHEVARRIA is a 76 year old man who presents for follow up office visit for further evaluation of joint symptoms and rheumatic diseases including osteoarthritis, chondrocalcinosis, Raynaud's, and remote history of PMR.\par \par Patient feels fairly well. Reports emesis three weeks ago secondary to taking omeprazole before breakfast. Discontinued for one week on his own, then restarted with meals and symptoms resolved. Patient reports pain right wrist and base of left thumb. Patient reports no right shoulder pain since right rotator cuff surgery. No recent Raynaud’s Episodes.  On further questioning, he states that he keeps the home thermostat at 68 °F.  Denies recent headache, jaw claudication, or visual symptoms.he denies recent paresthesias of his right fingers.  Recent orthopedic follow-up with Dr. Polanco --right ankle replacement stable and left ankle fusion stable.  The patient continues calcium  supplements. Patient denies rash or side effects with current medications. Patient is content with current medication regimen.\par \par

## 2023-02-26 NOTE — PHYSICAL EXAM
[General Appearance - Alert] : alert [General Appearance - In No Acute Distress] : in no acute distress [General Appearance - Well Nourished] : well nourished [General Appearance - Well Developed] : well developed [General Appearance - Well-Appearing] : healthy appearing [Sclera] : the sclera and conjunctiva were normal [PERRL With Normal Accommodation] : pupils were equal in size, round, and reactive to light [Extraocular Movements] : extraocular movements were intact [Outer Ear] : the ears and nose were normal in appearance [Neck Appearance] : the appearance of the neck was normal [Neck Cervical Mass (___cm)] : no neck mass was observed [Jugular Venous Distention Increased] : there was no jugular-venous distention [Thyroid Diffuse Enlargement] : the thyroid was not enlarged [Thyroid Nodule] : there were no palpable thyroid nodules [Cervical Lymph Nodes Enlarged Posterior Bilaterally] : posterior cervical [Cervical Lymph Nodes Enlarged Anterior Bilaterally] : anterior cervical [Supraclavicular Lymph Nodes Enlarged Bilaterally] : supraclavicular [Axillary Lymph Nodes Enlarged Bilaterally] : axillary [No CVA Tenderness] : no ~M costovertebral angle tenderness [No Spinal Tenderness] : no spinal tenderness [Lungs Percussion] : the lungs were normal to percussion [Heart Rate And Rhythm] : heart rate was normal and rhythm regular [Edema] : there was no peripheral edema [Abdomen Soft] : soft [Abdomen Tenderness] : non-tender [Abdomen Mass (___ Cm)] : no abdominal mass palpated [Skin Color & Pigmentation] : normal skin color and pigmentation [Skin Turgor] : normal skin turgor [] : no rash [Cranial Nerves] : cranial nerves 2-12 were intact [Deep Tendon Reflexes (DTR)] : deep tendon reflexes were 2+ and symmetric [Sensation] : the sensory exam was normal to light touch and pinprick [Motor Exam] : the motor exam was normal [No Focal Deficits] : no focal deficits [Oriented To Time, Place, And Person] : oriented to person, place, and time [Impaired Insight] : insight and judgment were intact [Affect] : the affect was normal [Mood] : the mood was normal [FreeTextEntry1] : Strength-5/5

## 2023-03-01 ENCOUNTER — APPOINTMENT (OUTPATIENT)
Dept: DERMATOLOGY | Facility: CLINIC | Age: 78
End: 2023-03-01
Payer: COMMERCIAL

## 2023-03-01 PROCEDURE — 99214 OFFICE O/P EST MOD 30 MIN: CPT

## 2023-03-21 ENCOUNTER — NON-APPOINTMENT (OUTPATIENT)
Age: 78
End: 2023-03-21

## 2023-03-21 DIAGNOSIS — R91.8 OTHER NONSPECIFIC ABNORMAL FINDING OF LUNG FIELD: ICD-10-CM

## 2023-03-21 DIAGNOSIS — R93.89 ABNORMAL FINDINGS ON DIAGNOSTIC IMAGING OF OTHER SPECIFIED BODY STRUCTURES: ICD-10-CM

## 2023-04-17 ENCOUNTER — NON-APPOINTMENT (OUTPATIENT)
Age: 78
End: 2023-04-17

## 2023-06-13 ENCOUNTER — APPOINTMENT (OUTPATIENT)
Dept: PULMONOLOGY | Facility: CLINIC | Age: 78
End: 2023-06-13
Payer: COMMERCIAL

## 2023-06-13 VITALS
OXYGEN SATURATION: 100 % | TEMPERATURE: 97.2 F | HEART RATE: 56 BPM | DIASTOLIC BLOOD PRESSURE: 79 MMHG | HEIGHT: 64 IN | WEIGHT: 137 LBS | BODY MASS INDEX: 23.39 KG/M2 | SYSTOLIC BLOOD PRESSURE: 164 MMHG

## 2023-06-13 PROCEDURE — 99205 OFFICE O/P NEW HI 60 MIN: CPT

## 2023-06-13 NOTE — HISTORY OF PRESENT ILLNESS
[TextBox_4] : 76 yo gentleman seen for many years by Dr Myron Kleiner\par Referred for abnormal CXR and then CT chest\par \par Smoker of minimal amounts till .  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\par Hx Prostate CA s/p proton therapy\par Hx surgery Right shoulder\par Hx Raynauds, previous PMR, + HOLDEN and treated with antiinflammatory meds\par Mother  from pneumonia, father  from prostate CA\par , 2 children\par \par Extensive review of CXRs and CTs from [facility] ([place]par CXR  suggested GENIE nodule\par CT chest showed only a vessel\par CXR from , 17, 18 , 19 20,  reviewed, showing no infiltrates\par CT chest from 2017 reviewed with patient \par Areas of GG infiltrate right base as well as lateral left base c/w inflammatory response\par \par CXR from 3/23 read as showing contiguous nodules in GENIE--recommended CT\par CT from 3/23 reviewed:\par Again, as in 2012, the possible nodule was a blood vessel and NO pulmonary nodule was identified\par Persistent GG areas in the lung, altho some improved somewhat , corresponding to those seen in 2017\par \par Patient has NO chest pain, shortness of breath on exertion or sputum production

## 2023-06-13 NOTE — ASSESSMENT
[FreeTextEntry1] : 76 yo gentleman seen for many years by Dr Myron Kleiner\par Referred for abnormal CXR and then CT chest\par \par Smoker of minimal amounts till .  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\par Hx Prostate CA s/p proton therapy\par Hx surgery Right shoulder\par Hx Raynauds, previous PMR, + HOLDEN and treated with antiinflammatory meds\par Mother  from pneumonia, father  from prostate CA\par , 2 children\par \par Extensive review of CXRs and CTs from [facility]\par CXR  suggested GENIE nodule\par CT chest showed only a vessel\par CXR from , 17,  , ,  reviewed, showing no infiltrates\par CT chest from 2017 reviewed with patient \par Areas of GG infiltrate right base as well as lateral left base c/w inflammatory response\par \par CXR from 3/23 read as showing contiguous nodules in GENIE--recommended CT\par CT from 3/23 reviewed:\par Again, as in 2012, the possible nodule was a blood vessel and NO pulmonary nodule was identified\par Persistent GG areas in the lung, altho some improved somewhat , corresponding to those seen in 2017\par \par Patient has NO chest pain, shortness of breath on exertion or sputum production\par \par Imp \par 76 yo gentleman with +HOLDEN, Raynauds and hx PMR, hx prostate CA\par CXR and CT do NOT show pulmonary mass or nodule\par Has GG areas in lower lobes c/w previous inflammatory, most date from his 2017 CT\par No evidence of suggestion of autoimmune related pulmonary disease\par No suggestion of pulmonary neoplasm\par No further w/u appears warranted at this time\par Films reviewed in detail with the patient

## 2023-06-19 ENCOUNTER — APPOINTMENT (OUTPATIENT)
Dept: RHEUMATOLOGY | Facility: CLINIC | Age: 78
End: 2023-06-19
Payer: COMMERCIAL

## 2023-06-19 VITALS
TEMPERATURE: 97.8 F | HEART RATE: 58 BPM | SYSTOLIC BLOOD PRESSURE: 120 MMHG | OXYGEN SATURATION: 98 % | DIASTOLIC BLOOD PRESSURE: 72 MMHG

## 2023-06-19 PROCEDURE — 99215 OFFICE O/P EST HI 40 MIN: CPT

## 2023-06-25 NOTE — ASSESSMENT
[FreeTextEntry1] : Impression: JOEL ECHEVARRIA is a 77 year old man who presents for follow up office visit for further evaluation of joint symptoms and rheumatic diseases including osteoarthritis, chondrocalcinosis, Raynaud's, and remote history of PMR.\par \par Patient feels fairly well. Occasional pain base of right thumb secondary to osteoarthritis. No recent Raynaud’s episodes. Occasional paresthesias right fingers secondary to carpal tunnel syndrome, anytime of day, not bothersome. Denies recent headache, jaw claudication, or visual symptoms. Occasional dry eyes in the morning, denies dry mouth, on exam has dry eyes although not bothersome I will monitor for Sjogren's syndrome. The patient continues calcium  supplements. Recent X-rays reveal osteoarthritis base of both thumbs and bilateral hips, with extensive discussion. Recent laboratory tests results reveal abnormal HOLDEN, otherwise normal, with extensive discussion. Patient denies rash or side effects with current medications. Patient is content with current medication regimen. \par \par Plan: I reviewed his chart and previous records\par I reviewed recent lab results with patient with extensive discussion\par I reviewed recent x-ray results with patient with extensive discussion \par Laboratory tests ordered - see list below - with coordination of care \par Diagnosis and prognosis discussed\par Continue current medications (other than those changed below) \par Patient declined any changes or additions to medication regimen \par Artificial tears one drop each eye q.i.d. and p.r.n.(Possible side effects explained)\par Biotene mouthwash/spray q.i.d. and p.r.n.(Possible side effects explained) \par Oral Hydration\par Patient declines oral medication for dryness \par Keep hands and feet and torso warm - patient warned of dangers of gangrene/digital amputation with Raynaud's\par Increase home thermostat to at least 72 to 74 °F\par Patient declined PT at this time \par Return visit in 4 months

## 2023-06-25 NOTE — HISTORY OF PRESENT ILLNESS
[FreeTextEntry1] : JOEL ECHEVARRIA is a 77 year old man who presents for follow up office visit for further evaluation of joint symptoms and rheumatic diseases including osteoarthritis, chondrocalcinosis, Raynaud's, and remote history of PMR.\par \par Patient feels fairly well. Occasional pain base of right thumb. No recent Raynaud’s episodes. Occasional paresthesias right fingers, anytime of day, not bothersome. Denies recent headache, jaw claudication, or visual symptoms. Occasional dry eyes in the morning, denies dry mouth. The patient continues calcium  supplements. Patient denies rash or side effects with current medications. Patient is content with current medication regimen.\par \par PMH\par Pulmonology -Dr. Hood- no change x.many years chest CAT scan/x-rays \par Hand surgeon recommended fusion right wrist- he is hesitant about proceeding with surgery \par Oncology - stable prostate cancer--PSA 0.4\par \par

## 2023-06-25 NOTE — REVIEW OF SYSTEMS
[Joint Pain] : joint pain [Negative] : Endocrine [Dry Eyes] : no dryness of the eyes [As Noted in HPI] : as noted in HPI

## 2023-06-25 NOTE — CONSULT LETTER
[Dear  ___] : Dear  [unfilled], [Consult Letter:] : I had the pleasure of evaluating your patient, [unfilled]. [Please see my note below.] : Please see my note below. [Consult Closing:] : Thank you very much for allowing me to participate in the care of this patient.  If you have any questions, please do not hesitate to contact me. [Sincerely,] : Sincerely, [DrAnnalise  ___] : Dr. THOMAS [DrAnnalise ___] : Dr. THOMAS [FreeTextEntry3] : Kirill\par Myron I. Kleiner, M.D., FACR\par Chief, Division of Rheumatology\par Department of Medicine\par [facility]

## 2023-06-25 NOTE — ADDENDUM
[FreeTextEntry1] : I, Thierry Leoa, acted solely as a scribe for Dr. Myron I. Kleiner, MD. on 06/19/2023 .

## 2023-10-19 ENCOUNTER — APPOINTMENT (OUTPATIENT)
Dept: PLASTIC SURGERY | Facility: CLINIC | Age: 78
End: 2023-10-19
Payer: COMMERCIAL

## 2023-10-19 ENCOUNTER — NON-APPOINTMENT (OUTPATIENT)
Age: 78
End: 2023-10-19

## 2023-10-19 VITALS — HEART RATE: 54 BPM | OXYGEN SATURATION: 99 % | DIASTOLIC BLOOD PRESSURE: 89 MMHG | SYSTOLIC BLOOD PRESSURE: 171 MMHG

## 2023-10-19 DIAGNOSIS — R22.1 LOCALIZED SWELLING, MASS AND LUMP, NECK: ICD-10-CM

## 2023-10-19 PROCEDURE — 99214 OFFICE O/P EST MOD 30 MIN: CPT

## 2023-10-24 ENCOUNTER — APPOINTMENT (OUTPATIENT)
Dept: RHEUMATOLOGY | Facility: CLINIC | Age: 78
End: 2023-10-24
Payer: COMMERCIAL

## 2023-10-24 VITALS
HEART RATE: 76 BPM | OXYGEN SATURATION: 98 % | DIASTOLIC BLOOD PRESSURE: 88 MMHG | HEIGHT: 64 IN | TEMPERATURE: 98.1 F | SYSTOLIC BLOOD PRESSURE: 132 MMHG

## 2023-10-24 DIAGNOSIS — G89.29 CERVICALGIA: ICD-10-CM

## 2023-10-24 DIAGNOSIS — M54.2 CERVICALGIA: ICD-10-CM

## 2023-10-24 DIAGNOSIS — M48.02 SPINAL STENOSIS, CERVICAL REGION: ICD-10-CM

## 2023-10-24 DIAGNOSIS — G56.01 CARPAL TUNNEL SYNDROME, RIGHT UPPER LIMB: ICD-10-CM

## 2023-10-24 DIAGNOSIS — M75.101 UNSPECIFIED ROTATOR CUFF TEAR OR RUPTURE OF RIGHT SHOULDER, NOT SPECIFIED AS TRAUMATIC: ICD-10-CM

## 2023-10-24 PROCEDURE — 99215 OFFICE O/P EST HI 40 MIN: CPT | Mod: 25

## 2023-10-24 PROCEDURE — 99417 PROLNG OP E/M EACH 15 MIN: CPT

## 2023-10-28 ENCOUNTER — NON-APPOINTMENT (OUTPATIENT)
Age: 78
End: 2023-10-28

## 2023-11-21 ENCOUNTER — APPOINTMENT (OUTPATIENT)
Dept: NEUROSURGERY | Facility: CLINIC | Age: 78
End: 2023-11-21
Payer: COMMERCIAL

## 2023-11-21 VITALS
SYSTOLIC BLOOD PRESSURE: 163 MMHG | HEIGHT: 64 IN | WEIGHT: 137 LBS | DIASTOLIC BLOOD PRESSURE: 76 MMHG | BODY MASS INDEX: 23.39 KG/M2

## 2023-11-21 DIAGNOSIS — M19.90 UNSPECIFIED OSTEOARTHRITIS, UNSPECIFIED SITE: ICD-10-CM

## 2023-11-21 PROCEDURE — 99203 OFFICE O/P NEW LOW 30 MIN: CPT

## 2023-12-18 NOTE — HISTORY OF PRESENT ILLNESS
[FreeTextEntry1] : JOEL ECHEVARRIA is a 77 year old man who presents for follow up office visit for further evaluation of joint symptoms and rheumatic diseases including osteoarthritis, chondrocalcinosis, Raynaud's, and remote history of PMR.  2 month history of neck pain without radiation, awakens him. 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 Denies recent headache, jaw claudication, or visual symptoms. No recent Raynaud's episodes. Denies paresthesias right fingers from carpal tunnel syndrome. Denies dry eyes and dry mouth. On his own discontinued Calcium supplement secondary to vomiting. Occasional Tylenol 1000 mg during the night p.r.n. with relief. Patient denies rash with current medications.  PMH s/p annual influenza vaccine and COVID booster vaccine

## 2023-12-18 NOTE — ADDENDUM
[FreeTextEntry1] : I, Thierry Leoa, acted solely as a scribe for Dr. Myron I. Kleiner, MD. on 10/24/2023 .

## 2023-12-18 NOTE — ASSESSMENT
[FreeTextEntry1] : Impression: JOEL ECHEVARRIA is a 77 year old man who presents for follow up office visit for further evaluation of joint symptoms and rheumatic diseases including osteoarthritis, chondrocalcinosis, Raynaud's, and remote history of PMR.  2 month history of neck pain without radiation secondary to osteoarthritis with neuroforaminal stenosis/spondylolisthesis/muscle spasms, awakens him. 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 Denies recent headache, jaw claudication, or visual symptoms. No recent Raynaud's episodes. Denies recent paresthesias right fingers from carpal tunnel syndrome. Denies dry eyes and dry mouth, on exam patient has dry eyes - I will continue to monitor for Sjogren's syndrome. On his own discontinued Calcium supplement secondary to vomiting. Patient handed me x-ray reports of C-spine performed October 19, 2023 revealing osteoarthritis, neuroforaminal stenosis, spondylolisthesis, and muscle spasms. Recent laboratory tests results were normal, with extensive discussion. Patient denies rash or other side effects with current medications.  Plan: I reviewed his chart and previous records I reviewed recent lab results with patient with extensive discussion I reviewed recent x-ray results with patient with extensive discussion Laboratory tests ordered - see list below - with coordination of care C-spine MRI ordered (no contraindications) - with coordination of care  Diagnosis and prognosis discussed Continue current medications (other than those changed below) Temporarily increase Oxaprozin 600 mg t.i.d end of breakfast, supper, and a bedtime snack for 10 days, then decrease b.i.d. thereafter (Possible side effects explained including cardiovascular risk/MI/CVA) Tizanidine 2 mg b.i.d. morning and supper, and 4 mg h.s.(Possible side effects explained)  If needed, add Tylenol 1000 mg or 1300 mg at bedtime (Possible side effects explained) In general, can take Tylenol 1000 mg t.i.d. p.r.n. or Tylenol 1300 mg b.i.d. p.r.n. (possible side effects explained)  Artificial tears one drop each eye q.i.d. and p.r.n.(Possible side effects explained) Biotene mouthwash/spray q.i.d. and p.r.n.(Possible side effects explained) Oral Hydration Patient declines oral medication for dryness Keep hands and feet and torso warm - patient warned of dangers of gangrene/digital amputation with Raynaud's--extensive discussion Increase home thermostat to at least 72 to 74 F --extensive discussion Depending on course, consider Pain Management consultation  Extensive discussion regarding the benefits of obtaining RSV vaccine  Return visit in 4 months All questions and concerns were addressed  Total time for this office visit, including face-to-face time and non-face-to-face time, 85 minutes--- including review of the chart and previous records, detailed review of his medical history, review of previous lab results with extensive discussion with the patient, ordering lab tests with coordination of care, review of recent imaging reports/x-ray results with extensive discussion with the patient, ordering of new MRI cervical spine with coordination of care, detailed medication history, review of medications going forward with their possible side effects, discussed use and value of pain management consultation, reviewed the impact of the patient's rheumatic disease on their other medical problems, reviewed the impact of the patient's other medical problems on their rheumatic disease

## 2023-12-18 NOTE — CONSULT LETTER
[FreeTextEntry3] : Kirill\par  Myron I. Kleiner, M.D., FACR\par  Chief, Division of Rheumatology\par  Department of Medicine\par  [facility]

## 2024-02-21 ENCOUNTER — APPOINTMENT (OUTPATIENT)
Dept: RHEUMATOLOGY | Facility: CLINIC | Age: 79
End: 2024-02-21
Payer: COMMERCIAL

## 2024-02-21 VITALS
HEIGHT: 64 IN | DIASTOLIC BLOOD PRESSURE: 68 MMHG | SYSTOLIC BLOOD PRESSURE: 118 MMHG | TEMPERATURE: 97.9 F | OXYGEN SATURATION: 99 % | HEART RATE: 67 BPM

## 2024-02-21 DIAGNOSIS — M79.645 PAIN IN RIGHT FINGER(S): ICD-10-CM

## 2024-02-21 DIAGNOSIS — M79.644 PAIN IN RIGHT FINGER(S): ICD-10-CM

## 2024-02-21 PROCEDURE — 99417 PROLNG OP E/M EACH 15 MIN: CPT

## 2024-02-21 PROCEDURE — 99215 OFFICE O/P EST HI 40 MIN: CPT

## 2024-02-21 PROCEDURE — G2211 COMPLEX E/M VISIT ADD ON: CPT | Mod: NC,1L

## 2024-02-21 RX ORDER — METHYLPREDNISOLONE 4 MG/1
4 TABLET ORAL
Qty: 21 | Refills: 0 | Status: DISCONTINUED | COMMUNITY
Start: 2022-10-13 | End: 2024-02-21

## 2024-02-21 RX ORDER — TIZANIDINE 2 MG/1
2 TABLET ORAL
Qty: 360 | Refills: 0 | Status: DISCONTINUED | COMMUNITY
Start: 2023-10-24 | End: 2024-02-21

## 2024-02-21 RX ORDER — CEFDINIR 300 MG/1
300 CAPSULE ORAL
Qty: 20 | Refills: 0 | Status: DISCONTINUED | COMMUNITY
Start: 2022-10-13 | End: 2024-02-21

## 2024-02-24 ENCOUNTER — NON-APPOINTMENT (OUTPATIENT)
Age: 79
End: 2024-02-24

## 2024-02-25 RX ORDER — AMLODIPINE BESYLATE 5 MG/1
5 TABLET ORAL
Refills: 0 | Status: ACTIVE | COMMUNITY

## 2024-02-25 NOTE — CONSULT LETTER
[FreeTextEntry3] : Kirill\par  Myron I. Kleiner, M.D., FACR\par  Chief, Division of Rheumatology\par  Department of Medicine\par  [facility]

## 2024-02-25 NOTE — CONSULT LETTER
[FreeTextEntry3] : Kirill\par  Myron I. Kleiner, M.D., FACR\par  Chief, Division of Rheumatology\par  Department of Medicine\par  [facility]

## 2024-02-25 NOTE — HISTORY OF PRESENT ILLNESS
[FreeTextEntry1] : JOEL ECHEVARRIA is a 78 year old man who presents for follow up office visit for further evaluation of joint symptoms and rheumatic diseases including osteoarthritis, chondrocalcinosis, Raynaud's, and remote history of PMR.  Some pain right wrist and base of the thumb. Denies other joint pain. Denies Morning stiffness. No recent Raynaud's episodes. Denies paresthesias right fingers from carpal tunnel syndrome. Denies dry eyes and dry mouth. Tolerating medication such as omeprazole. Recently starting having "100 day" cough non productive, no fever or chills, no SOB, Secondary to unsure at this time. Will f/u with pulmonologist. History of neck pain without radiation, awakens him.  He saw neurosurgery about his abnormal MRI cervical spine who felt that no surgery was indicated and recommended conservative measures.  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 Denies recent headache, jaw claudication, or visual symptoms. On his own discontinued Calcium supplement secondary to vomiting. Occasional Tylenol 1000 mg during the night p.r.n. with relief.  In January he followed up with foot and ankle orthopedic Dr. Polanco at \[facility]\"" regarding his right ankle prosthesis found him to be stable.  Patient denies rash with current medications.  He is content with the current treatment regimen.  PMH PCP follow-up--- hypertension--- treated Norvasc

## 2024-02-25 NOTE — ADDENDUM
[FreeTextEntry1] :  I, Raghu Perdomo , acted solely as a scribe for Dr. Myron I. Kleiner, MD. on 02/21/2024.

## 2024-02-25 NOTE — HISTORY OF PRESENT ILLNESS
[FreeTextEntry1] : JOEL ECHEVARRIA is a 78 year old man who presents for follow up office visit for further evaluation of joint symptoms and rheumatic diseases including osteoarthritis, chondrocalcinosis, Raynaud's, and remote history of PMR.  Some pain right wrist and base of the thumb. Denies other joint pain. Denies Morning stiffness. No recent Raynaud's episodes. Denies paresthesias right fingers from carpal tunnel syndrome. Denies dry eyes and dry mouth. Tolerating medication such as omeprazole. Recently starting having "100 day" cough non productive, no fever or chills, no SOB, Secondary to unsure at this time. Will f/u with pulmonologist. History of neck pain without radiation, awakens him.  He saw neurosurgery about his abnormal MRI cervical spine who felt that no surgery was indicated and recommended conservative measures.  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 Denies recent headache, jaw claudication, or visual symptoms. On his own discontinued Calcium supplement secondary to vomiting. Occasional Tylenol 1000 mg during the night p.r.n. with relief.  In January he followed up with foot and ankle orthopedic Dr. Polanco at [facility] regarding his right ankle prosthesis found him to be stable.  Patient denies rash with current medications.  He is content with the current treatment regimen.  PMH PCP follow-up--- hypertension--- treated Norvasc

## 2024-02-25 NOTE — ASSESSMENT
[FreeTextEntry1] : Impression: JOEL ECHEVARRIA is a 78 year old man who presents for follow up office visit for further evaluation of joint symptoms and rheumatic diseases including osteoarthritis, chondrocalcinosis, Raynaud's, and remote history of PMR.  He has been having some pain in the base of his right thumb and his right wrist secondary to his osteoarthritis and chondrocalcinosis.  History of neck pain without radiation secondary to osteoarthritis with neuroforaminal stenosis/spondylolisthesis/muscle spasms with probable compromise of the left C6 nerve root and mass effect on the spinal cord--for which neurosurgery feels no surgery is indicated at this time.  He recommended conservative measures.. Denies recent headache, jaw claudication, or visual symptoms. No recent Raynaud's episodes. Denies recent paresthesias right fingers from carpal tunnel syndrome. Denies recent dry eyes and dry mouth, on exam patient has dry eyes - I will continue to monitor for Sjogren's syndrome. Pt shows consistent reoccurring cough non productive, no fever or chills, no SOB, Secondary to Unclear etiology at this time and for which he will will f/u with pulmonologist.  During the office visit he had immediate chest CAT scan report from January 27 which revealed resolved peripheral patchy groundglass opacities bilateral lower lobes however new faint opacities right lower lobe, stable small right lung nodules, stable emphysema.  I will get additional serologies regarding the cough and abnormal chest CAT scan.  Patient denies rash or other side effects with current medications.  Plan: I reviewed his chart and previous records I reviewed his previous lab results with the patient with extensive discussion I reviewed again the MRI cervical spine with the patient with extensive discussion including review of the consultation report and findings and recommendations of the neurosurgeon I reviewed the chest CAT scan report that the patient handed to me during the office visit today, with extensive discussion  X-rays ordered  see list below  with coordination of care Laboratory tests ordered - see list below - with coordination of care Diagnosis and prognosis discussed Continue current medications (other than those changed below) Increase Omeprazole 40 mg q.d. a.c. breakfast (possible side effects explained);  if no change in cough in 7 days return back to 20 mg once daily (Possible side effects explained) Patient declined any other change or addition of any medications at this time Artificial tears one drop each eye q.i.d. and p.r.n.(Possible side effects explained) Biotene mouthwash/spray q.i.d. and p.r.n.(Possible side effects explained) Oral Hydration Patient declines oral medication for dryness Keep hands and feet and torso warm - patient warned of dangers of gangrene/digital amputation with Raynaud's--extensive discussion Increase home thermostat to at least 72 to 74 F --extensive discussion Pulmonary follow-up--- emphasized Recommended to pt to visit to f/u with GI - extensive discussion Send report consult report from pulmonologist with coordination of care Return visit in 4 months All questions and concerns were addressed  Total time for this office visit, including face-to-face time and non-face-to-face time, 71 minutes--- including review of the chart and previous records, detailed review of his medical history, review of previous lab results with extensive discussion with the patient, ordering lab tests with coordination of care, review of recent chest CAT scan report results which the patient handed to me during the office visit today with extensive discussion with the patient, review of the previous MRI cervical spine with extensive discussion with the patient, review of the neurosurgery consultation report and his recommendations with extensive discussion with this patient, detailed medication history, review of medications going forward with their possible side effects

## 2024-03-06 ENCOUNTER — APPOINTMENT (OUTPATIENT)
Dept: DERMATOLOGY | Facility: CLINIC | Age: 79
End: 2024-03-06
Payer: COMMERCIAL

## 2024-03-06 PROCEDURE — 99214 OFFICE O/P EST MOD 30 MIN: CPT

## 2024-06-28 ENCOUNTER — APPOINTMENT (OUTPATIENT)
Dept: RHEUMATOLOGY | Facility: CLINIC | Age: 79
End: 2024-06-28
Payer: COMMERCIAL

## 2024-06-28 VITALS
TEMPERATURE: 98.2 F | HEIGHT: 64 IN | DIASTOLIC BLOOD PRESSURE: 82 MMHG | HEART RATE: 67 BPM | OXYGEN SATURATION: 98 % | WEIGHT: 135 LBS | BODY MASS INDEX: 23.05 KG/M2 | SYSTOLIC BLOOD PRESSURE: 128 MMHG

## 2024-06-28 DIAGNOSIS — Z79.1 LONG TERM (CURRENT) USE OF NON-STEROIDAL ANTI-INFLAMMATORIES (NSAID): ICD-10-CM

## 2024-06-28 DIAGNOSIS — M21.952 UNSPECIFIED ACQUIRED DEFORMITY OF RIGHT THIGH: ICD-10-CM

## 2024-06-28 DIAGNOSIS — M51.26 OTHER INTERVERTEBRAL DISC DISPLACEMENT, LUMBAR REGION: ICD-10-CM

## 2024-06-28 DIAGNOSIS — M21.951 UNSPECIFIED ACQUIRED DEFORMITY OF RIGHT THIGH: ICD-10-CM

## 2024-06-28 DIAGNOSIS — H04.123 DRY EYE SYNDROME OF BILATERAL LACRIMAL GLANDS: ICD-10-CM

## 2024-06-28 DIAGNOSIS — R93.89 ABNORMAL FINDINGS ON DIAGNOSTIC IMAGING OF OTHER SPECIFIED BODY STRUCTURES: ICD-10-CM

## 2024-06-28 DIAGNOSIS — M35.3 POLYMYALGIA RHEUMATICA: ICD-10-CM

## 2024-06-28 DIAGNOSIS — I73.00 RAYNAUD'S SYNDROME W/OUT GANGRENE: ICD-10-CM

## 2024-06-28 DIAGNOSIS — M11.20 OTHER CHONDROCALCINOSIS, UNSPECIFIED SITE: ICD-10-CM

## 2024-06-28 DIAGNOSIS — M15.9 POLYOSTEOARTHRITIS, UNSPECIFIED: ICD-10-CM

## 2024-06-28 DIAGNOSIS — M79.645 PAIN IN LEFT FINGER(S): ICD-10-CM

## 2024-06-28 PROCEDURE — 99215 OFFICE O/P EST HI 40 MIN: CPT

## 2024-06-28 PROCEDURE — 99417 PROLNG OP E/M EACH 15 MIN: CPT

## 2024-06-28 PROCEDURE — G2211 COMPLEX E/M VISIT ADD ON: CPT | Mod: NC

## 2024-11-07 ENCOUNTER — APPOINTMENT (OUTPATIENT)
Dept: RHEUMATOLOGY | Facility: CLINIC | Age: 79
End: 2024-11-07
Payer: COMMERCIAL

## 2024-11-07 VITALS
RESPIRATION RATE: 17 BRPM | OXYGEN SATURATION: 99 % | WEIGHT: 136 LBS | DIASTOLIC BLOOD PRESSURE: 80 MMHG | BODY MASS INDEX: 23.22 KG/M2 | SYSTOLIC BLOOD PRESSURE: 126 MMHG | HEIGHT: 64 IN | HEART RATE: 66 BPM | TEMPERATURE: 98 F

## 2024-11-07 DIAGNOSIS — M51.26 OTHER INTERVERTEBRAL DISC DISPLACEMENT, LUMBAR REGION: ICD-10-CM

## 2024-11-07 DIAGNOSIS — M35.3 POLYMYALGIA RHEUMATICA: ICD-10-CM

## 2024-11-07 DIAGNOSIS — R93.89 ABNORMAL FINDINGS ON DIAGNOSTIC IMAGING OF OTHER SPECIFIED BODY STRUCTURES: ICD-10-CM

## 2024-11-07 DIAGNOSIS — M79.645 PAIN IN LEFT FINGER(S): ICD-10-CM

## 2024-11-07 DIAGNOSIS — G47.62 SLEEP RELATED LEG CRAMPS: ICD-10-CM

## 2024-11-07 DIAGNOSIS — H04.123 DRY EYE SYNDROME OF BILATERAL LACRIMAL GLANDS: ICD-10-CM

## 2024-11-07 DIAGNOSIS — I73.00 RAYNAUD'S SYNDROME W/OUT GANGRENE: ICD-10-CM

## 2024-11-07 DIAGNOSIS — Z79.1 LONG TERM (CURRENT) USE OF NON-STEROIDAL ANTI-INFLAMMATORIES (NSAID): ICD-10-CM

## 2024-11-07 DIAGNOSIS — M15.9 POLYOSTEOARTHRITIS, UNSPECIFIED: ICD-10-CM

## 2024-11-07 DIAGNOSIS — M11.20 OTHER CHONDROCALCINOSIS, UNSPECIFIED SITE: ICD-10-CM

## 2024-11-07 PROCEDURE — G2211 COMPLEX E/M VISIT ADD ON: CPT | Mod: NC

## 2024-11-07 PROCEDURE — 99417 PROLNG OP E/M EACH 15 MIN: CPT

## 2024-11-07 PROCEDURE — 99215 OFFICE O/P EST HI 40 MIN: CPT

## 2025-03-06 ENCOUNTER — APPOINTMENT (OUTPATIENT)
Dept: DERMATOLOGY | Facility: CLINIC | Age: 80
End: 2025-03-06
Payer: COMMERCIAL

## 2025-03-06 PROCEDURE — 99214 OFFICE O/P EST MOD 30 MIN: CPT

## 2025-03-14 ENCOUNTER — RX RENEWAL (OUTPATIENT)
Age: 80
End: 2025-03-14

## 2025-04-02 ENCOUNTER — APPOINTMENT (OUTPATIENT)
Dept: RHEUMATOLOGY | Facility: CLINIC | Age: 80
End: 2025-04-02
Payer: COMMERCIAL

## 2025-04-02 ENCOUNTER — NON-APPOINTMENT (OUTPATIENT)
Age: 80
End: 2025-04-02

## 2025-04-02 VITALS
SYSTOLIC BLOOD PRESSURE: 114 MMHG | DIASTOLIC BLOOD PRESSURE: 64 MMHG | OXYGEN SATURATION: 98 % | HEART RATE: 67 BPM | TEMPERATURE: 98 F | HEIGHT: 64 IN

## 2025-04-02 DIAGNOSIS — I73.00 RAYNAUD'S SYNDROME W/OUT GANGRENE: ICD-10-CM

## 2025-04-02 DIAGNOSIS — M21.952 UNSPECIFIED ACQUIRED DEFORMITY OF RIGHT THIGH: ICD-10-CM

## 2025-04-02 DIAGNOSIS — R93.89 ABNORMAL FINDINGS ON DIAGNOSTIC IMAGING OF OTHER SPECIFIED BODY STRUCTURES: ICD-10-CM

## 2025-04-02 DIAGNOSIS — M15.9 POLYOSTEOARTHRITIS, UNSPECIFIED: ICD-10-CM

## 2025-04-02 DIAGNOSIS — Z79.1 LONG TERM (CURRENT) USE OF NON-STEROIDAL ANTI-INFLAMMATORIES (NSAID): ICD-10-CM

## 2025-04-02 DIAGNOSIS — M11.20 OTHER CHONDROCALCINOSIS, UNSPECIFIED SITE: ICD-10-CM

## 2025-04-02 DIAGNOSIS — M51.26 OTHER INTERVERTEBRAL DISC DISPLACEMENT, LUMBAR REGION: ICD-10-CM

## 2025-04-02 DIAGNOSIS — G47.62 SLEEP RELATED LEG CRAMPS: ICD-10-CM

## 2025-04-02 DIAGNOSIS — H04.123 DRY EYE SYNDROME OF BILATERAL LACRIMAL GLANDS: ICD-10-CM

## 2025-04-02 DIAGNOSIS — M21.951 UNSPECIFIED ACQUIRED DEFORMITY OF RIGHT THIGH: ICD-10-CM

## 2025-04-02 DIAGNOSIS — M35.3 POLYMYALGIA RHEUMATICA: ICD-10-CM

## 2025-04-02 PROCEDURE — G2211 COMPLEX E/M VISIT ADD ON: CPT | Mod: NC

## 2025-04-02 PROCEDURE — 99214 OFFICE O/P EST MOD 30 MIN: CPT

## 2025-04-06 RX ORDER — DICLOFENAC SODIUM 50 MG/1
50 TABLET, DELAYED RELEASE ORAL
Qty: 180 | Refills: 0 | Status: ACTIVE | COMMUNITY
Start: 2025-04-06 | End: 1900-01-01

## 2025-04-06 RX ORDER — BACLOFEN 10 MG/1
10 TABLET ORAL
Qty: 90 | Refills: 0 | Status: ACTIVE | COMMUNITY
Start: 2025-04-06 | End: 1900-01-01

## 2025-04-29 ENCOUNTER — NON-APPOINTMENT (OUTPATIENT)
Age: 80
End: 2025-04-29

## 2025-08-04 ENCOUNTER — APPOINTMENT (OUTPATIENT)
Dept: RHEUMATOLOGY | Facility: CLINIC | Age: 80
End: 2025-08-04
Payer: COMMERCIAL

## 2025-08-04 VITALS
WEIGHT: 137 LBS | RESPIRATION RATE: 17 BRPM | HEART RATE: 55 BPM | SYSTOLIC BLOOD PRESSURE: 132 MMHG | BODY MASS INDEX: 23.39 KG/M2 | TEMPERATURE: 98.3 F | HEIGHT: 64 IN | OXYGEN SATURATION: 99 % | DIASTOLIC BLOOD PRESSURE: 80 MMHG

## 2025-08-04 DIAGNOSIS — M11.20 OTHER CHONDROCALCINOSIS, UNSPECIFIED SITE: ICD-10-CM

## 2025-08-04 DIAGNOSIS — M25.552 PAIN IN RIGHT HIP: ICD-10-CM

## 2025-08-04 DIAGNOSIS — Z79.1 LONG TERM (CURRENT) USE OF NON-STEROIDAL ANTI-INFLAMMATORIES (NSAID): ICD-10-CM

## 2025-08-04 DIAGNOSIS — G47.62 SLEEP RELATED LEG CRAMPS: ICD-10-CM

## 2025-08-04 DIAGNOSIS — I73.00 RAYNAUD'S SYNDROME W/OUT GANGRENE: ICD-10-CM

## 2025-08-04 DIAGNOSIS — M35.3 POLYMYALGIA RHEUMATICA: ICD-10-CM

## 2025-08-04 DIAGNOSIS — M15.9 POLYOSTEOARTHRITIS, UNSPECIFIED: ICD-10-CM

## 2025-08-04 DIAGNOSIS — M25.562 PAIN IN RIGHT KNEE: ICD-10-CM

## 2025-08-04 DIAGNOSIS — G89.29 PAIN IN RIGHT KNEE: ICD-10-CM

## 2025-08-04 DIAGNOSIS — M51.26 OTHER INTERVERTEBRAL DISC DISPLACEMENT, LUMBAR REGION: ICD-10-CM

## 2025-08-04 DIAGNOSIS — R93.89 ABNORMAL FINDINGS ON DIAGNOSTIC IMAGING OF OTHER SPECIFIED BODY STRUCTURES: ICD-10-CM

## 2025-08-04 DIAGNOSIS — M25.561 PAIN IN RIGHT KNEE: ICD-10-CM

## 2025-08-04 DIAGNOSIS — M25.551 PAIN IN RIGHT HIP: ICD-10-CM

## 2025-08-04 DIAGNOSIS — E55.9 VITAMIN D DEFICIENCY, UNSPECIFIED: ICD-10-CM

## 2025-08-04 PROCEDURE — 99215 OFFICE O/P EST HI 40 MIN: CPT

## 2025-08-04 PROCEDURE — G2211 COMPLEX E/M VISIT ADD ON: CPT | Mod: NC

## 2025-08-04 PROCEDURE — 99417 PROLNG OP E/M EACH 15 MIN: CPT
